# Patient Record
Sex: FEMALE | Race: BLACK OR AFRICAN AMERICAN | NOT HISPANIC OR LATINO | Employment: OTHER | URBAN - METROPOLITAN AREA
[De-identification: names, ages, dates, MRNs, and addresses within clinical notes are randomized per-mention and may not be internally consistent; named-entity substitution may affect disease eponyms.]

---

## 2017-05-05 ENCOUNTER — GENERIC CONVERSION - ENCOUNTER (OUTPATIENT)
Dept: OTHER | Facility: OTHER | Age: 77
End: 2017-05-05

## 2017-05-08 ENCOUNTER — GENERIC CONVERSION - ENCOUNTER (OUTPATIENT)
Dept: OTHER | Facility: OTHER | Age: 77
End: 2017-05-08

## 2018-04-17 ENCOUNTER — TELEPHONE (OUTPATIENT)
Dept: PAIN MEDICINE | Facility: CLINIC | Age: 78
End: 2018-04-17

## 2018-04-17 NOTE — TELEPHONE ENCOUNTER
Pt returning call  Pt did not know she was being referred to see Dr Catarina Zuleta  Told pt we still need prior records before scheduling her and she stated she was going to call Dr Vaishnavi Vaughn to get clarification as to why she is being referred to us   Pt will call back once she talks to Dr Corinthia Oppenheim call back # [phone]

## 2018-04-26 NOTE — TELEPHONE ENCOUNTER
I called pt and she said she wants to think a while longer if she wants to see Dr Paulette Evans  She said she would call us back if she wants to schedule

## 2019-05-01 NOTE — TELEPHONE ENCOUNTER
Called pt and let her know we need med recs from Dr Saray Mcintyre  She said to mail her an julianne and she will mail it back to us  Received faxed referral from Dr Edel Flores

## 2019-05-20 NOTE — TELEPHONE ENCOUNTER
S/w pt and she said she received the julianne in the mail  She said she has not filled it out yet  I explained if she does and mails it back, then I can get the med recs from Dr Velna Goodell

## 2019-06-12 NOTE — TELEPHONE ENCOUNTER
Faxed a request from our office to Dr Angel Perry for med recs  I have not received patient's julianne yet

## 2023-06-13 ENCOUNTER — EVALUATION (OUTPATIENT)
Age: 83
End: 2023-06-13
Payer: MEDICARE

## 2023-06-13 DIAGNOSIS — R29.898 WEAKNESS OF BOTH LOWER EXTREMITIES: Primary | ICD-10-CM

## 2023-06-13 DIAGNOSIS — I69.359 CVA, OLD, HEMIPARESIS (HCC): ICD-10-CM

## 2023-06-13 DIAGNOSIS — I63.9 CEREBROVASCULAR ACCIDENT (CVA), UNSPECIFIED MECHANISM (HCC): Primary | ICD-10-CM

## 2023-06-13 PROCEDURE — 97530 THERAPEUTIC ACTIVITIES: CPT

## 2023-06-13 PROCEDURE — 97167 OT EVAL HIGH COMPLEX 60 MIN: CPT

## 2023-06-13 PROCEDURE — 97163 PT EVAL HIGH COMPLEX 45 MIN: CPT

## 2023-06-13 NOTE — PROGRESS NOTES
PT Evaluation          POC expires Auth Status Total   Visits  Start date  Expiration date PT/OT + Visit Limit? Co-Insurance   23 Not req BOMN 23 no Yes                                         Visit/Unit Tracking                                                   Today's date: 2023  Patient name: Zebedee Severe  : 1940  MRN: [id number]  Referring provider: Kathie Olivier MD  Dx:   Encounter Diagnosis     ICD-10-CM    1  CVA, old, hemiparesis ([facility] [address] )  I69 359       2  Weakness of both lower extremities  R29 898             Assessment  Assessment details: Patient is a 80 y o  Female who presents to skilled outpatient PT with chronic CVA (early )  Upon initial evaluation, pt has R-sided hemiparesis/hemiplegia from CVA resulting in dec LE strength, poor posture, dec sitting balance, dec sensation in RLE, dec activity tolerance and endurance  Completed 3 bouts of standing, 2 person assist ranging from mod Ax2-max Ax1 with 2nd person A for blocking R knee  Pt will benefit from skilled OP PT to maximize functional mobility independence and dec risk for falls  Will see pt 2x/week  Significant amount of time spent completing patient education about bracing, LE strengthening HEP, and POC  Pt and caregivers agreeable  Impairments: Abnormal coordination, Abnormal gait, Abnormal muscle tone, Abnormal or restricted ROM, Activity intolerance, Impaired balance, Impaired physical strength, Lacks appropriate HEP, Poor posture, Poor body mechanics, Pain with function, Safety issue, Weight-bearing intolerance, Abnormal movement, Difficulty understanding, Abnormal muscle firing  Understanding of Dx/Px/POC: Good  Prognosis: Good    Patient verbalized understanding of POC  Please contact me if you have any questions or recommendations  Thank you for the referral and the opportunity to share in  [facility] care      Plan  Plan details: Will see pt 2x/week  Patient would benefit from: PT Eval, Skilled PT, OT Eval and Speech Eval  Planned modality interventions: Biofeedback, Cryotherapy, TENS, Thermotherapy  Planned therapy interventions: Abdominal trunk stabilization, ADL training, Balance, Balance/WB training, Breathing training, Body mechanics training, Coordination, Functional ROM exercises, Gait training, HEP, Joint Mobilization, Manual Therapy, Gloria taping, Motor coordination training, Neuromuscular re-education, Patient education, Postural training, Strengthening, Stretching, Therapeutic activities, Therapeutic exercises, Therapeutic training, Transfer training, Activity modification, Work reintegration  Frequency: 2x/wk  Duration in weeks: 12  Plan of Care beginning date: 6/13/23  Plan of Care expiration date: 12 weeks - 9/5/2023  Treatment plan discussed with: Patient and caretakers     Goals  Short Term Goals (4 weeks):    - Patient will be independent in basic HEP 2-3 weeks  - Patient will improve 5xSTS score by 2 3 seconds to promote improved LE functional strength needed for ADLs  - Patient will demo standing tolerance to 60s  - Pt will improve SIS to 230   - Pt will improve STS to mod Ax1    Long Term Goals (12 weeks):  - Patient will be independent in a comprehensive home exercise program  - Patient will complete SCOTT to facilitate return to safe independent ambulation  - Patient will stand at [facility] for 2 minutes  - Patient will improve SIS to 250  - Pt will complete slide board transfer with Mod Ax1 to demo improved independence and dec caregiver burden    Subjective  History of Present Illness  Mechanism of injury: Pt reports 2022 CVA using letty lift and dependent at home  She is not doing any standing at home, completed leg strengthening exercises in chair  She has been receiving PT/OT at [facility] for the past year  Reports ability to stand utilizing standing frame, has not completed without   Goal is to get better with transfers and walk  She is accompanied by two caretakers  Spoke with daughter on phone, will get [facility] faxed over for more information of current status  Primary AD: none/letty dependent  Assist level at home: dependent   WC usage: primary   PLOF: independent before CVA    Pain  Current pain rating: none  Hand dominance: R handed    Objective   LE MMT  - R Hip Flexion: 0/5  - L Hip Flexion: 1/5  - R Knee Extension: 1+/5 - L Knee Extension: 3+/5  - R Ankle DF: 1/5  - L Ankle DF: 3-/5  - R Ankle PF: 0/5  - L Ankle PF: 1+/5    Sensation  - Light touch: WFL  - Pt reports dec sensation in RLE & RUE    Modified Kush  - R knee extension: 0 - no increase in tone  - L knee extension: 0 - no increase in tone  - R knee flexion: 0 - no increase in tone  - L knee flexion: 0 - no increase in tone  - R ankle DF: 0 - no increase in tone   - L ankle DF: 0 - no increase in tone     Standing:   - 3 stands at long bar mirror  Mod Ax2 2 repetitions (20s, 15s), max Ax1 with SBA and R knee blocking by 2nd person (11s)      Outcome Measures Initial Eval  6/13 scores   SIS *caregiver completed physical 10    Memory & thinking 29    Mood and emotions 41    communication 30    ADLs 17    mobility 13    hand 9    Activities/meaning 41    How much recovered since CVA 30    TOTAL SCORE: 220/395        Precautions: CVA, awaiting fax of [facility] notes of PMHx  No past medical history on file

## 2023-06-13 NOTE — PROGRESS NOTES
OCCUPATIONAL THERAPY INITIAL EVALUATION    Today's Date: 2023  Patient Name: Guevara Escalante  : 1940  MRN: [id number]  Referring Provider: Dee Zambrano MD  Dx: Cerebrovascular accident (CVA), unspecified mechanism ([facility] [address] ) [I63 9]    SKILLED ANALYSIS:            Pt is a right hand dominant 80year old female presenting to OP OT s/p CVA (~2 years ago) with R side weakness (chronic)  Pt and caregivers currently reports difficulty with all ADLs, use of R UE, sitting balance, endurance/activity tolerance, bed mobility, transfers  Pt was receiving outpatient OT at [place] [address] 2x/week prior to transfer to this facility  Pt reports previous therapy occasionally performed lateral transfers to/from mat with transfer board or utilized mechanical  Reports they were addressing R UE function  Pt reports slight blurry vision  Noticeable cognitive impairments with aphasia throughout evaluation  Pt is demonstrating deficits based on clinical observation and the following assessments: AROM, PROM seated, MAS of R UE, temporal orientation, coordination testing  Post assessments pt demonstrating the following: Presents with impaired R UE and L UE ROM and strength overall, R UE weaker then L UE  Significantly limited AROM of R UE, specifically shoulder  L UE functional at this time  Slight pain at elbow with R UE PROM due to spasticity  Presents with chronic subluxation on middle aspect of R shoulder  Due to cognitive deficits and aphasia, pt had difficulty comprehending coordination testing components  Noted with impaired [address] and [address] through clinical  observation  1+ at shoulder flexors and elbow flexors on MAS  Pt has been seen for OT services and would benefit from transitioning to maintenance program as patient demonstrates difficulty in daily tasks, including remembering recent events, ADL management, and completing health management     Tessa Sorensen (2013) is a [facility] decision that clarified "that Medicare will in fact cover skilled therapy services to maintain a patient’s current condition or prevent/slow further deterioration  Recommend OP OT 1-2x/wk for 8-12 weeks with focus on aforementioned deficits to maximize functional performance and improve QOL  Findings and recommendations discussed with pt, and they are in agreement  Educated caregivers on charges of insurance, POC, goal creation, and OP OT services  POC expires Auth Status Total   Visits  Start date  Expiration date PT/OT + Visit Limit? Co-Insurance   9/13/23 BOMN   6/13  BOMN                                            Visit/Unit Tracking  AUTH Status:  Date 6/13              Visits  Authed:  Used 1(eval)                Remaining                  PLAN OF CARE START:6/13/23  PLAN OF CARE END: 9/13/2023  PROGRESS NOTE DUE: follow up post scheduling/auth   FREQUENCY: 1-2x/week for 8-12 weeks   PRECAUTIONS: 1:1 at all times, cog/aphasia, maintainence program     DIAGNOSIS: Chronic CVA with R side weakness   VISITS: 1 (Eval)     Subjective  Occupational Profile  Pt resides with daughter,  recently passed away  Pt resides in private home with ramped enterance  First floor set up  Transferring in/out of bed via mechanical lift  All ADLs total A bed level at this time  Total A with IADLs including community mobility  Pt does not own any braces  Pt currently not working, however during working years was a   Pt reports interest in clothing including \"creation\" of them  Pt has 24 hours private care  Pt remains in bed majority of the time, transfers out of bed for therapy only  Aide reports patient sits EOB occasionally  PATIENT GOAL: \"to move from bed to chair without letty lift\"     HISTORY OF PRESENT ILLNESS:   Pt is a 80 y o  female who was referred to Occupational Therapy s/p  Cerebrovascular accident (CVA), unspecified mechanism ([facility] [address] ) [I63 9] Hx of CVA ~2 years ago   Pt was receiving outpatient therapy services " at [facility], transferred to this facility due to location  Pt was receiving OT/PT/SLP 2x/week  Last visit reported by patient was last week  PMH: No past medical history on file      Past Surgical Hx:   Past Surgical History:   Procedure Laterality Date   • FL GUIDED CENTRAL VENOUS ACCESS DEVICE REMOVAL  9/14/2021        Pain:  Location: legs and back occasionally     Objective  Impairment Observations:    CLAIRE Fraser           UPPER EXTREMITY FUNCTION   Impaired Impaired Dominant Hand: R UE     AROM (Seated)      ~1 finger subluxation noted in R UE middle aspect of joint     Elevation <1/2 range  Near full     Shoulder FF <1/2 range   3/4 range      Shoulder Ext <1/2 range   3/4 range      Shoulder Abd <1/2 range   3/4 range      Shoulder Add <1/2 range   1/2 range      Horizontal Abd <1/2 range   1/2 range      Horizontal Add <1/2 range   Near full      External rotation  <1/2 range  Near full     Elbow Flex 1/2 range  Near full      Elbow Ext 1/2 range  Near full      Pronation <1/2 range   Near full      Supination <1/2 range  Near full      Wrist Flex 1/2 range   Full      Wrist Ext 1/2 range  Full      Digit Flex 3/4 range   full     Digit Ex 3/4 range   Full      Composite Grasp 3/4 range   Full        PROM (seated position)    Right side tested only        Shoulder FF 3/4 range     Shoulder Ext 3/4 range      Shoulder ABD 3/4 range      Shoulder ADD 3/4 range      Horizontal ABD 3/4 range     Horizontal ADD 3/4 range      Elbow Flex 3/4 range   Spasticity noted    Elbow Ext 3/4 range      Wrist Flex 3/4 range      Wrist Ext 3/4 range      Digit Flex full     Digit Ext Full      Supination 3/4 range      Pronation 3/4 range         COORDINATION      Opposition Unable  Impaired  Difficulty with command following    Finger to Nose Unable  Impaired   difficulty with command following      TONE: MODIFIED EMMANUEL SCALE      No increase in muscle tone (0)      Slight Increase in muscle tone with catch and release or min resist at end range (1)      Slight Increase in muscle tone with catch and release, followed by min resistance through remainder of range (1+) Elbow flexors, shoulder flexors      Increased muscle tone through full range, able to be moved easily (2)      Considerable increase in tone, difficult to move (3)      Rigid in Flexion/Extension (4)               TEMPORAL ORIENTATION: oriented to month, day of the week, and date only  Reported it was 2024   Oriented to type of facility and location   SITTING BALANCE: per clincial judgement, up to max A     SHORT TERM GOALS (4-6 weeks)    GOAL  STATUS ON IE  GOAL STATUS    Caregiver will demo good carryover of clinic and home tone reduction strategies for maintained AROM initiation with functional reach with ADL fxn 1+ Established      Caregivers will demo G carryover of Home Exercise Program to improve functional progression towards goals in plan of care and for improved functional use of UE  Established      Pt will tolerate supine and/or seated exercise x 8 minutes with minimal rest breaks required for maintained engagement in life roles and weekly exercise regimen   Established      Pt will demo ability to tolerate SS position EOM with mod-max A for sitting balance while performing therapeutic activity of choice in order to maintain core stability for no less then 10 minutes  Max A  Established      LONG TERM GOALS( 8-12 weeks)    GOAL  STATUS ON IE  GOAL STATUS    Pt will be oriented x4 100% of the time for 5/5 trials using external cues as needed  x3  Established      Pt will tolerate supine and/or seated exercise x 10 minutes with minimal rest breaks required for maintained engagement in life roles and weekly exercise regimen   Established      Pt will demo ability to follow 1-2 step commands with 100% accuracy for 3/5 trials for maintained engagement in salient tasks  Established      Caregivers will demo and/or verbalize understanding of use of recommended DME and/or AE for increased patient safety   Established      Caregivers will demo G comprehension of safe transfer and bed mobility techniques to maintain safety during ADLs and mobility   Established            OTHER PLANNED THERAPY INTERVENTIONS:   Supine, seated, and in stance neuro re-ed  Tricep AG  NMES/FES  FMC/prehension  Timed Trials  Manual tx  Hand to target  Sensory re-ed  Seated functional reach: crossing midline  Supine place and hold  WBearing strategies   Closed chain activities  Open chain activities  Internal and external memory aides  Multimatrix for saccades/ visual clutter/attention  Hypersensitivity strategies education  Multi-modal environment  Sustained/alternating/divided attention  Tracking tube  Oculomotor control:  saccades, con/divergence  Conv /div   Dynamic tasks  Work stations with timed transitions  Temporal Awareness  Memory and mental manipulation  Auditory processing with immediate recall  Memory retention with immediate and delayed recall  Edu on cog/vision apps

## 2023-06-15 ENCOUNTER — OFFICE VISIT (OUTPATIENT)
Age: 83
End: 2023-06-15
Payer: MEDICARE

## 2023-06-15 DIAGNOSIS — R29.898 WEAKNESS OF BOTH LOWER EXTREMITIES: Primary | ICD-10-CM

## 2023-06-15 DIAGNOSIS — I69.359 CVA, OLD, HEMIPARESIS (HCC): ICD-10-CM

## 2023-06-15 PROCEDURE — 97112 NEUROMUSCULAR REEDUCATION: CPT | Performed by: PHYSICAL THERAPIST

## 2023-06-15 NOTE — PROGRESS NOTES
Daily Note     Today's date: 6/15/2023  Patient name: Rodrigue Mason  : 1940  MRN: [id number]  Referring provider: Loy Rodriguez MD  Dx:   Encounter Diagnosis     ICD-10-CM    1  Weakness of both lower extremities  R29 898       2  CVA, old, hemiparesis ([facility] [address] )  I69 359                      Subjective: PT arrive in w/c and ready for therapy  Present is daughter and home health aides  Objective: See treatment diary below  Sit to standing @ long bar: 2 minute rest break between each attempt to stand to allow recovery for max effort for next rep pre research standards  Rep 1: Mod A x 2 with cuing to maintain knee extension standing duration 20-30 seconds   Rep 2 : Min A x 1 with cuing for upright posture, standing duration 60 seconds    Rep 3: CGA with ability to pull self up standing duration 20 seconds    Rep 4: CGA with ability to pull self up, standing duration 20 seconds    Rep 5: Min A x 1 with, cuing upright posture, standing duration 15 seconds    Rep 6: Min A x 1 with standing duration of 15 seconds  Seated LAQ    R LE: manual resistance with extension and flexion 2 sets 10 reps, varying resistance to achieve full range   L LE: manual resistance with extension and flexion 2 sets 10 reps, varying resistance to achieve full range     Seated march:   R LE: active assistive to achieve full height and cuing for eccentric lowering to challenge hip flexor more, 2 sets x 10 reps    L LE: manual resistance throughout range with varying degree to allow full range   Sitting upright in w/c 1 minute active 1 minute rest break    5 reps total with cuing for upright posture throughout  Seated active over head lift of arms 10 reps with Min A to Mod A for right side to achieve full range   Seated upright trunk rotation with active assistance to allow rotation to end range 2 sets 10 reps     HEP:  Access Code: [email or web address]  URL: [email or web address]  Date: 06/15/2023  Prepared by: Carolina Roy Kiner    Exercises  - Seated March  - 1 x daily - 7 x weekly - 2 sets - 10 reps - 3 hold  - Seated Long Arc Quad  - 1 x daily - 7 x weekly - 2 sets - 10 reps - 3 hold  - Seated Trunk Rotation - Arms Crossed  - 1 x daily - 7 x weekly - 2 sets - 10 reps - 3 hold  - Seated Upright Posture Correction  - 1 x daily - 7 x weekly - 2 sets - 5 reps - 60 hold    Assessment:  Tolerated session well this date with focus on standing tolerance and LE active movement  Required cuing for upright posture as needed during session  Was able to perform 2 sit to stands without physical assistance from PT  Pt has excellent movement in LEs which will continue to enhance standing ability  Education to patient conduct exercises as often as she can at home to further improve strength  Advised to conduct an exercises every 30 minutes during commercial break if watching TV  Noted that next session will perform stand pivot transfer form w/c to edge of mat table to further improve transfer ability  Stressed to patient that she has great potentional to improve function, but she need to do as much as she can herself at home to further enhance current gains she has  Pt was agreeable to PT recommendation  Spoke with patient daughter about sending HEP via email and will issue physical papers next session  Will reach out to seizure MD for clearance for possible use of NMES to rafael side  Asked if they are able to bring in the walker they have at home secondary to walkers here at the clinic are standard and have a weight limit  Plan: Continue per plan of care  POC expires Auth Status Total   Visits  Start date  Expiration date PT/OT + Visit Limit?  Co-Insurance   9/5/23 Not req BOMN 6/13/23 9/5/23 no Yes                                         Visit/Unit Tracking  6/13 6/15

## 2023-06-20 ENCOUNTER — APPOINTMENT (OUTPATIENT)
Age: 83
End: 2023-06-20
Payer: MEDICARE

## 2023-06-22 ENCOUNTER — OFFICE VISIT (OUTPATIENT)
Age: 83
End: 2023-06-22
Payer: MEDICARE

## 2023-06-22 ENCOUNTER — TELEPHONE (OUTPATIENT)
Age: 83
End: 2023-06-22

## 2023-06-22 DIAGNOSIS — I63.9 CEREBROVASCULAR ACCIDENT (CVA), UNSPECIFIED MECHANISM (HCC): Primary | ICD-10-CM

## 2023-06-22 DIAGNOSIS — I69.359 CVA, OLD, HEMIPARESIS (HCC): ICD-10-CM

## 2023-06-22 DIAGNOSIS — R29.898 WEAKNESS OF BOTH LOWER EXTREMITIES: Primary | ICD-10-CM

## 2023-06-22 PROCEDURE — 97112 NEUROMUSCULAR REEDUCATION: CPT

## 2023-06-22 PROCEDURE — 97112 NEUROMUSCULAR REEDUCATION: CPT | Performed by: PHYSICAL THERAPIST

## 2023-06-22 PROCEDURE — 97530 THERAPEUTIC ACTIVITIES: CPT

## 2023-06-22 NOTE — PROGRESS NOTES
POC expires Auth Status Total   Visits  Start date  Expiration date PT/OT + Visit Limit? Co-Insurance   23 BOMN     BOMN                                            Visit/Unit Tracking  AUTH Status:  Date              Visits  Authed:  Used 1(eval)  1                             PLAN OF CARE START:23  PLAN OF CARE END: 2023  PROGRESS NOTE DUE: follow up post scheduling/auth   FREQUENCY: 1-2x/week for 8-12 weeks   PRECAUTIONS: 1:1 at all times, cog/aphasia, maintainence program     DIAGNOSIS: Chronic CVA with R side weakness   VISITS: 2 of 24       Daily Note     Today's date: 2023  Patient name: Antony Olivas  : 1940  MRN: [id number]  Referring provider: Renard Story MD  Dx:   Encounter Diagnosis   Name Primary? • Cerebrovascular accident (CVA), unspecified mechanism ([facility] [address] ) Yes       Start Time: 1500  Stop Time: 1543  Total time in clinic (min): 43 minutes      Subjective: Pt reports no changes since last vision       Objective: See treatment below  TA:   *R UE AAROM/PROM HEP:   Therapist reviewed and provided visual demonstration of HEP  Pts caregivers verbalized understanding of exercises back to therapist  Provided pt with written description and image of exercises  Instructed to discontinue with any discomfort or irritation  Reviewed the following exercises:   Access Code: [email or web address]  URL: [email or web address]  Exercises  - Seated Shoulder Flexion Towel Slide at Table Top  - 1 x daily - 7 x weekly - 2 sets - 10 reps  - Seated Shoulder Flexion AAROM with Caregiver  - 1 x daily - 7 x weekly - 2 sets - 10 reps  - Seated Shoulder Abduction AAROM with Caregiver  - 1 x daily - 7 x weekly - 2 sets - 10 reps  - Supported Elbow Flexion Extension PROM  - 1 x daily - 7 x weekly - 2 sets - 10 reps  - Seated Wrist Extension PROM with caregiver  - 1 x daily - 7 x weekly - 2 sets - 10 reps  - Seated PROM Wrist Flexion PROM with Caregiver  - 1 x daily - 7 x weekly - 2 sets - 10 reps    *total A x2 (max A x1, min-mod A x1) to perform lateral transfer with board w/c<>EOM    NMRE:   *performed the following seated SS EOM with emphasis on sitting balance, weight shifting, and functional reaching:  -reach,grasp,release of cones R side outside CARLITO<>L side of mat using L UE to grasp cones  CS-CG for sitting balance, mod VC for initiation, and VC for encouragement, x3 bouts total with ~8 cones   -R side lateral forearm props with therapist facilitation for ascend/descend to/from mat  Assist to weight bearing through forearm, x12 reps total     Assessment: Tolerated treatment well  Benefits form verbal encouragement throughout session  Pt would benefit from continued OT services to address R UE AAROM, weight bearing, sitting balance, tone management, endurance/acitivty tolerance, basic direction following, sustained attention  Plan: Continued skilled OT per POC

## 2023-06-22 NOTE — PROGRESS NOTES
Daily Note     Today's date: 2023  Patient name: Micheal Higgins  : 1940  MRN: [id number]  Referring provider: Marbella Cheng MD  Dx:   Encounter Diagnosis     ICD-10-CM    1  Weakness of both lower extremities  R29 898       2  CVA, old, hemiparesis ([facility] [address] )  I69 359                      Subjective: PT arrive in w/c and ready for therapy  Reports doing home program, Aide notes doing it daily  Objective: See treatment diary below  Sit to standing @ long bar: 1-2 minute rest break between each attempt to stand to allow recovery for max effort for next rep pre research standards  Rep 1: Mod A x 1 with cuing to maintain knee extension standing duration 20 seconds   Rep 2: Max A x 1 with cuing for upright posture, standing duration 30 seconds    Rep 3: Mod A x 2  with ability to pull self up standing duration 30 seconds    Rep 4: Mod A x 2  with ability to pull self up standing duration 30 seconds    Rep 5: Max A x 2 with, cuing upright posture, standing duration 20 seconds    Rep 6: Max A x 2 with standing duration of 15 seconds    Standing with rolling walker, patient home walker rated at 350lb  Rep 7: With Rolling Walker: Max x 2 for stand and Max A x 1 for standing 15 seconds   Rep 8: with Rolling walker: set up with feet underneath her to allow 90 deg knee flexion, max A x 2 with standing for 30 seconds with Max A x 1 for standing    Rep 9: with Rolling walker: set up with feet underneath her to allow 90 deg knee flexion, max A x 2 with standing for 15 seconds with Max A x 1 for standing     Seated LAQ    R LE: manual resistance with extension  and flexion 2 sets 10 reps, varying resistance to achieve full range    L LE: manual resistance with extension  2 sets 10 reps, 3 sec hold end range with 2lb ankle wt on left leg       Seated march:   R LE: active assistive to achieve full height and cuing for eccentric lowering to challenge hip flexor more, 2 sets x 10 reps    L LE: manual resistance throughout range with varying degree to allow full range     Sitting upright in w/c 1 minute active 1 minute rest break    5 reps total with cuing for upright posture throughout    Seated active over head lift of arms 10 reps with Min A to Mod A for right side to achieve full range       HEP:  Access Code: [email or web address]  URL: [email or web address]  Date: 06/15/2023  Prepared by: Xavier Hugigns    Exercises  - Seated March  - 1 x daily - 7 x weekly - 2 sets - 10 reps - 3 hold  - Seated Long Arc Quad  - 1 x daily - 7 x weekly - 2 sets - 10 reps - 3 hold  - Seated Trunk Rotation - Arms Crossed  - 1 x daily - 7 x weekly - 2 sets - 10 reps - 3 hold  - Seated Upright Posture Correction  - 1 x daily - 7 x weekly - 2 sets - 5 reps - 60 hold    Assessment:  Tolerated session well this date with focus on standing tolerance and LE active movement  Required cuing for upright posture as needed during session  Required increased assistance today with sit to stands with average assistance Min A to Max A with 1 person to 2 person assist  Was unable to pull self up today, max encouragement provided with cuing to tighten knees and buttock region to remain upright  Improvement in standing tolerance with usage of leg with RW this date than long bar  Improvement BL hip flexion with slight leaning back into w/c to allow clearance for active hip flexion  Use of 2 lb wt on left side and active assisted/ manual resistance on right side  Printed physical copy of HEP this date and gave to aide  Still waiting on MD clearance for possible use of NMES to rafael side  Aide to continue to bring their walker from home  Plan: Continue per plan of care  POC expires Auth Status Total   Visits  Start date  Expiration date PT/OT + Visit Limit?  Co-Insurance   9/5/23 Not req BOMN 6/13/23 9/5/23 no Yes                                         Visit/Unit Tracking  6/13 6/15 6/22

## 2023-06-27 ENCOUNTER — OFFICE VISIT (OUTPATIENT)
Age: 83
End: 2023-06-27
Payer: MEDICARE

## 2023-06-27 DIAGNOSIS — I63.9 CEREBROVASCULAR ACCIDENT (CVA), UNSPECIFIED MECHANISM (HCC): Primary | ICD-10-CM

## 2023-06-27 DIAGNOSIS — I69.359 CVA, OLD, HEMIPARESIS (HCC): ICD-10-CM

## 2023-06-27 DIAGNOSIS — R29.898 WEAKNESS OF BOTH LOWER EXTREMITIES: Primary | ICD-10-CM

## 2023-06-27 PROCEDURE — 97112 NEUROMUSCULAR REEDUCATION: CPT

## 2023-06-27 PROCEDURE — 97530 THERAPEUTIC ACTIVITIES: CPT

## 2023-06-27 PROCEDURE — 97112 NEUROMUSCULAR REEDUCATION: CPT | Performed by: PHYSICAL THERAPIST

## 2023-06-27 PROCEDURE — 97530 THERAPEUTIC ACTIVITIES: CPT | Performed by: PHYSICAL THERAPIST

## 2023-06-27 NOTE — PROGRESS NOTES
Daily Note     Today's date: 2023  Patient name: Fay Dominguez  : 1940  MRN: [id number]  Referring provider: Angelo Hull MD  Dx:   Encounter Diagnosis     ICD-10-CM    1  Weakness of both lower extremities  R29 898       2  CVA, old, hemiparesis ([facility] [address] )  I69 [id number]                      Subjective: PT arrive in w/c and ready for therapy  Reports doing home program, Aide notes doing it daily  Objective: See treatment diary below  Sit to standing @ long bar: 1-2 minute rest break between each attempt to stand to allow recovery for max effort for next rep pre research standards  VCs: to remain upright, manual blocking of right knee with PT knee to hold knee extension  Rep 1: Mod A x 2 with progression to min A X 1 once upright  Duration of standing 15 seconds    Rep 2: Mod A x 2 with progression to min A X 1 once upright  Duration of standing 20 seconds   Rep 3: Mod A x 2 with progression to min A X 1 once upright  Duration of standing 10 seconds   Rep 4: Mod A x 2 with progression to min A X 1 once upright  Duration of standing 8 seconds   Rep 5: Mod A x 2 with progression to min A X 1 once upright  Duration of standing 7 seconds   Refused to stand any further, re-attempted post Seated marches and LAQ  With ability to one conducted 1 further stand,   Rep 6: Max A x 1 with progression to min A X 1 once upright  Duration of standing 8 seconds    Seated LAQ    R LE: manual resistance with extension  and flexion 2 sets 10 reps, varying resistance to achieve full range    L LE: manual resistance with extension  2 sets 10 reps, 3 sec hold end range with 2lb ankle wt on left leg       Seated march:   R LE: active assistive to achieve full height and cuing for eccentric lowering to challenge hip flexor more, 2 sets x 10 reps    L LE: manual resistance throughout range with varying degree to allow full range     Sitting upright in w/c 1 minute active 1 minute rest break    5 reps total with cuing for upright posture throughout    Seated active over head lift of arms 10 reps with Min A to Mod A for right side to achieve full range       HEP:  Access Code: [email or web address]  URL: [email or web address]  Date: 06/15/2023  Prepared by: Emmie Lanes    Exercises  - Seated March  - 1 x daily - 7 x weekly - 2 sets - 10 reps - 3 hold  - Seated Long Arc Quad  - 1 x daily - 7 x weekly - 2 sets - 10 reps - 3 hold  - Seated Trunk Rotation - Arms Crossed  - 1 x daily - 7 x weekly - 2 sets - 10 reps - 3 hold  - Seated Upright Posture Correction  - 1 x daily - 7 x weekly - 2 sets - 5 reps - 60 hold    Assessment:  Tolerated session fair this date with focus on standing tolerance and LE active movement  Required cuing for upright posture as needed during session  Continues to require physical assistance of Mod A x 2 to achieve upright and max encouragement to remain up right  Pt did refuse to continue with standing activity but able to complete at end of session after switching focus  Standing tolerance of 20 to 8 seconds  Increased engagement with seated march and LAQ  Aide to continue to bring their walker from home  Plan: Continue per plan of care  POC expires Auth Status Total   Visits  Start date  Expiration date PT/OT + Visit Limit?  Co-Insurance   9/5/23 Not req BOMN 6/13/23 9/5/23 no Yes                                         Visit/Unit Tracking  6/13 6/15 6/22 6/27

## 2023-06-27 NOTE — PROGRESS NOTES
POC expires Auth Status Total   Visits  Start date  Expiration date PT/OT + Visit Limit? Co-Insurance   23 BOMN     BOMN                                            Visit/Unit Tracking  AUTH Status:  Date             Visits  Authed:  Used 1(eval)  1 1                           PLAN OF CARE START:23  PLAN OF CARE END: 2023  PROGRESS NOTE DUE: 23 (progress note)   FREQUENCY: 1-2x/week for 8-12 weeks   PRECAUTIONS: 1:1 at all times, cog/aphasia, maintainence program     DIAGNOSIS: Chronic CVA with R side weakness   VISITS: 3 of 24       Daily Note     Today's date: 2023  Patient name: Moiz Muhammad  : 1940  MRN: [id number]  Referring provider: Marisa Reed MD  Dx:   Encounter Diagnosis   Name Primary? • Cerebrovascular accident (CVA), unspecified mechanism ([facility] [address] ) Yes                    Subjective: Pt and caregiver reports no changes since last vision       Objective: See treatment below  TA:   *total A x2 (max A x1, min-mod A x1) to perform lateral transfer with board w/c<>EOM    NMRE:   *performed the following seated SS EOM with emphasis on sitting balance, weight shifting, and functional reaching:  -modified anterior weight shifting with use of large physio ball and therapist shoulder for faciliation, x12 reps total; minimal shift noted with fear of falling   -reach,grasp,release of various plastic disc R side of mat<>tabletop using R UE gross grasp  Assist for release of disc and finger extension  x1 bout to/from EOM performed with increased time  VC throughout for initiation and attention  Assist to move shoulder in gravity eliminated plane    -modified crunches using blue wedge, x10 reps total, up to mod A for concentric movement     Assessment: Tolerated treatment well  Benefits form verbal encouragement throughout session   Pt would benefit from continued OT services to address R UE AAROM, weight bearing, sitting balance, tone management, endurance/acitivty tolerance, basic direction following, sustained attention  Plan: Continued skilled OT per POC

## 2023-06-29 ENCOUNTER — OFFICE VISIT (OUTPATIENT)
Age: 83
End: 2023-06-29
Payer: MEDICARE

## 2023-06-29 DIAGNOSIS — R29.898 WEAKNESS OF BOTH LOWER EXTREMITIES: Primary | ICD-10-CM

## 2023-06-29 DIAGNOSIS — I69.359 CVA, OLD, HEMIPARESIS (HCC): ICD-10-CM

## 2023-06-29 DIAGNOSIS — I63.9 CEREBROVASCULAR ACCIDENT (CVA), UNSPECIFIED MECHANISM (HCC): Primary | ICD-10-CM

## 2023-06-29 PROCEDURE — 97112 NEUROMUSCULAR REEDUCATION: CPT

## 2023-06-29 PROCEDURE — 97530 THERAPEUTIC ACTIVITIES: CPT

## 2023-06-29 NOTE — PROGRESS NOTES
POC expires Auth Status Total   Visits  Start date  Expiration date PT/OT + Visit Limit? Co-Insurance   23 BOMN     BOMN                                            Visit/Unit Tracking  AUTH Status:  Date            Visits  Authed:  Used 1(eval)  1 1 1            Remaining               PLAN OF CARE START:23  PLAN OF CARE END: 2023  PROGRESS NOTE DUE: 23 (progress note)   FREQUENCY: 1-2x/week for 8-12 weeks   PRECAUTIONS: 1:1 at all times, cog/aphasia, maintainence program     DIAGNOSIS: Chronic CVA with R side weakness   VISITS: 4 of 24       Daily Note     Today's date: 2023  Patient name: Hilda Powers  : 1940  MRN: [id number]  Referring provider: Jamie Florian MD  Dx:   Encounter Diagnosis   Name Primary? • Cerebrovascular accident (CVA), unspecified mechanism ([facility] [address] ) Yes                    Subjective: Pt and caregiver reports no changes since last vision       Objective: See treatment below  TA:   *total A x2 (max A x1 anteriorly, min-mod A x3 posteriorly) to perform lateral transfer with board w/c<>EOM    *R UE PROM: seated EOM into all planes and pivots to patient tolerance with 5-10 second hold at end range  Focused on decreased stiffness, increased ROM, and to promote motor recovery for increased functional use of R UE  Performed in prep for therapeutic activity  NMRE:   *performed the following seated SS EOM with emphasis on sitting balance, weight shifting, and functional reaching:  -blaze pod tap in field of 2 using L UE to reach to right side of tabletop and tap when lighting up randomly  VC for initiation and large amplitude movements   -reach, grasp,release of yellow and red resistive clothespins table on R side>mat on L side using L UE 3 point pinch  Max VC for initiation  Assessment: Tolerated treatment fair  Pt required up to max A for sitting balance this session and increased transfer assistance    Benefits form verbal encouragement throughout session  Pt would benefit from continued OT services to address R UE AAROM, weight bearing, sitting balance, weight shifting, tone management, endurance/acitivty tolerance, basic direction following, sustained attention  Plan: Continued skilled OT per POC

## 2023-06-29 NOTE — PROGRESS NOTES
Daily Note     Today's date: 2023  Patient name: Ike Muñoz  : 1940  MRN: [id number]  Referring provider: Meghana Kolb MD  Dx:   Encounter Diagnosis     ICD-10-CM    1  Weakness of both lower extremities  R29 898       2  CVA, old, hemiparesis ([facility] [address] )  I69 [id number]                      Subjective: Pt reports that she is doing well today  Objective: See treatment diary below  Sit to standing @ long bar: 1-2 minute rest break between each attempt to stand to allow recovery for max effort for next rep pre research standards  VCs: to remain upright, manual blocking of right knee with PT knee to hold knee extension  Rep 1: Mod A x 2 with progression to min A X 1 once upright  Duration of standing 40 seconds    Rep 2: Mod A x 2 with progression to min A X 1 once upright  Duration of standing 20 seconds   Rep 3: Mod A x 2 with progression to min A X 1 once upright  Duration of standing 25 seconds   Rep 4: Mod A x 2 with progression to min A X 1 once upright  Duration of standing 8 seconds   Rep 5: Mod A x 2 with progression to min A X 1 once upright  Duration of standing 20 seconds   Refused to stand any further, re-attempted post Seated marches and LAQ  With ability to one conducted 1 further stand,   Rep 6: Max A x 1 with progression to min A X 1 once upright  Duration of standing 8 seconds    Seated LAQ    R LE: manual resistance with extension  and flexion 2 sets 10 reps, varying resistance to achieve full range    L LE: manual resistance with extension  2 sets 10 reps, 3 sec hold end range with 2lb ankle wt on left leg       Seated march:   R LE: active assistive to achieve full height and cuing for eccentric lowering to challenge hip flexor more, 2 sets x 10 reps    L LE: manual resistance throughout range with varying degree to allow full range     Sitting upright in w/c 1 minute active 1 minute rest break    5 reps total with cuing for upright posture throughout    Seated active over head lift of arms 10 reps with Min A to Mod A for right side to achieve full range       HEP:  Access Code: [email or web address]  URL: [email or web address]  Date: 06/15/2023  Prepared by: Kanchan Wen    Exercises  - Seated March  - 1 x daily - 7 x weekly - 2 sets - 10 reps - 3 hold  - Seated Long Arc Quad  - 1 x daily - 7 x weekly - 2 sets - 10 reps - 3 hold  - Seated Trunk Rotation - Arms Crossed  - 1 x daily - 7 x weekly - 2 sets - 10 reps - 3 hold  - Seated Upright Posture Correction  - 1 x daily - 7 x weekly - 2 sets - 5 reps - 60 hold  - active weight shifting in wheelchair 1 x 10    Assessment:  Tolerated session fair this date with focus on standing tolerance and LE active movement  Patient continues to require mod A x 2 to achieve upright position  Patient requires encouragement in order to stay standing and verbal cues to stay upright  Patient improved her standing tolerance from last visit  Patient was able to tolerate increased resistance with LAQ  Patient needed cues to for posture  Educated patient and aides to bring walker next visit  Plan: Continue per plan of care  POC expires Auth Status Total   Visits  Start date  Expiration date PT/OT + Visit Limit?  Co-Insurance   9/5/23 Not req BOMN 6/13/23 9/5/23 no Yes                                         Visit/Unit Tracking  6/13 6/15 6/22 6/27 6/29

## 2023-07-06 ENCOUNTER — OFFICE VISIT (OUTPATIENT)
Age: 83
End: 2023-07-06
Payer: MEDICARE

## 2023-07-06 DIAGNOSIS — I63.9 CEREBROVASCULAR ACCIDENT (CVA), UNSPECIFIED MECHANISM (HCC): Primary | ICD-10-CM

## 2023-07-06 PROCEDURE — 97530 THERAPEUTIC ACTIVITIES: CPT

## 2023-07-06 PROCEDURE — 97112 NEUROMUSCULAR REEDUCATION: CPT

## 2023-07-06 NOTE — PROGRESS NOTES
POC expires Auth Status Total   Visits  Start date  Expiration date PT/OT + Visit Limit? Co-Insurance   23 BOMN     BOMN                                            Visit/Unit Tracking  AUTH Status:  Date           Visits  Authed:  Used 1(eval)  1 1 1 1           Remaining              PLAN OF CARE START:23  PLAN OF CARE END: 2023  PROGRESS NOTE DUE: 23 (progress note)   FREQUENCY: 1-2x/week for 8-12 weeks   PRECAUTIONS: 1:1 at all times, cog/aphasia, maintainence program     DIAGNOSIS: Chronic CVA with R side weakness   VISITS: 5 of 24       Daily Note     Today's date: 2023  Patient name: Lencho Berry  : 1940  MRN: [id number]  Referring provider: Kailyn Hernandez MD  Dx:   Encounter Diagnosis   Name Primary? • Cerebrovascular accident (CVA), unspecified mechanism ([address]) Yes       Start Time: 1501  Stop Time: 1545  Total time in clinic (min): 44 minutes      Subjective: Pt and caregiver reports no changes since last vision       Objective: See treatment below. TA:   *R UE PROM: seated in wheelchair into all planes and pivots to patient tolerance with 5-10 second hold at end range. Focused on decreased stiffness, increased ROM, and to promote motor recovery for increased functional use of R UE. Performed in prep for therapeutic activity. *pt unable to state year, month, day of the week when prompted. Able to accurately verbalize each when provided with multiple choice option in field of 3 with increased time. NMRE:   *reach,grasp,release of squigs mirror>therapist on R side using L UE. Pt instructed to verbalize any word associated with color she was grasping. Instructed to grasp certain color based off of therapist verbal command. Focus on anterior weight shifting, sitting balance, functional reach, direction following, and problem solving. Required physical assist to weight shift (up to max A ) to grasp items outside CARLITO.  VC for sustained attention in low stim environment. Min-mod VC for verbalizing name of word based off color of squigs. *reach,grasp,release of squigs tabletop anteriorly>bucket on R side using R UE. Required visual cue for gross hand extension/grasp, able to actively perform however apraxia noted. Pt required active assisted support of shoulder and proximal UEs during transition from tabletop>bucket. Focus on R UE motor control and functional movement. x2 bouts total with rest breaks as needed. Assessment: Tolerated treatment well. Benefits form verbal encouragement throughout session. Trini vs. Franchesca (2013) is a [facility] decision that clarified that Medicare will in fact cover skilled therapy services to maintain a patient’s current condition or prevent/slow further deterioration. Pt would benefit from continued OT services to address R UE AAROM, weight bearing, sitting balance, weight shifting, tone management, endurance/acitivty tolerance, basic direction following, sustained attention. Plan: Continued skilled OT per POC.

## 2023-07-11 ENCOUNTER — OFFICE VISIT (OUTPATIENT)
Age: 83
End: 2023-07-11
Payer: MEDICARE

## 2023-07-11 DIAGNOSIS — I69.359 CVA, OLD, HEMIPARESIS (HCC): ICD-10-CM

## 2023-07-11 DIAGNOSIS — I63.9 CEREBROVASCULAR ACCIDENT (CVA), UNSPECIFIED MECHANISM (HCC): Primary | ICD-10-CM

## 2023-07-11 DIAGNOSIS — R29.898 WEAKNESS OF BOTH LOWER EXTREMITIES: Primary | ICD-10-CM

## 2023-07-11 PROCEDURE — 97530 THERAPEUTIC ACTIVITIES: CPT

## 2023-07-11 PROCEDURE — 97112 NEUROMUSCULAR REEDUCATION: CPT

## 2023-07-11 NOTE — PROGRESS NOTES
POC expires Auth Status Total   Visits  Start date  Expiration date PT/OT + Visit Limit? Co-Insurance   23 BOMN     BOMN                                            Visit/Unit Tracking  AUTH Status:  Date          Visits  Authed:  Used 1(eval)  1 1 1 1 1          Remaining   22 21 20 19 18           PLAN OF CARE START:23  PLAN OF CARE END: 2023  PROGRESS NOTE DUE: 23 (progress note)   FREQUENCY: 1-2x/week for 8-12 weeks   PRECAUTIONS: 1:1 at all times, cog/aphasia, maintainence program     DIAGNOSIS: Chronic CVA with R side weakness   VISITS: 6 of 24       Daily Note     Today's date: 2023  Patient name: Opal Almazan  : 1940  MRN: [id number]  Referring provider: Derek Lopez MD  Dx:   Encounter Diagnosis   Name Primary? • Cerebrovascular accident (CVA), unspecified mechanism ([address]) Yes                    Subjective: Pt and caregiver reports no changes since last vision       Objective: See treatment below. TA:   *time spent this session repositioning in wheelchair for optimal letty pad placement. Personal aides required to assist.     *R UE PROM: seated EOM into all planes and pivots to patient tolerance with 5-10 second hold at end range. Focused on decreased stiffness, increased ROM, and to promote motor recovery for increased functional use of R UE. Performed in prep for therapeutic activity. NMRE:   *performed the following seated in w/c with emphasis on sitting balance, weight bearing of R UE, weight shifting, and functional reaching:  -modified anterior weight shifting with use of therapist shoulder for faciliation, x12 reps total; minimal shift noted with fear of falling   -reach,grasp,release of bean bags mat>bucket at midline using R UE gross grasp. x2 bouts total with multiple bean bags. Pt required therapist to assist with steadying proximal UE and provide AAROM during movement.  With VC and visual cues able to inconsistently perform gross grasp/release on bean bag. Performed seated anteriorly facing mat surface as tabletop   -attempted to have patient perform modified push ups using mat surface while seated in w/c, unable to perform due to cognitive and ROM limitations this session    Assessment: Tolerated treatment well. Trailed transfer to mat this session, required use of private aides due to environmental limitations, pt functional status, and w/c safety (only 1 lock of wheelchair properly brakes, unable to fix in this facility due to lack of tools needed). Transfer not completed for safety reasons and need for use of private aides for assistance. Transfers to/from mat will no longer be continued unless adequate amount of employees in this facility only are present to assist and provide superivsion for safety or facility obtains mechanical lift for dependent tranfers. Continue to educate caregivers on safety of fixing wheelchair brakes. Benefits form verbal encouragement throughout session. Trini Sorensen (2013) is a [facility] decision that clarified that Medicare will in fact cover skilled therapy services to maintain a patient’s current condition or prevent/slow further deterioration. Pt would benefit from continued OT services to address R UE AAROM, weight bearing, sitting balance, weight shifting, tone management, endurance/acitivty tolerance, basic direction following, sustained attention. Plan: Continued skilled OT per POC.

## 2023-07-11 NOTE — PROGRESS NOTES
Daily Note     Today's date: 2023  Patient name: Kyle Arriaga  : 1940  MRN: [id number]  Referring provider: Britni Sawyer MD  Dx:   Encounter Diagnosis     ICD-10-CM    1. Weakness of both lower extremities  R29.898       2. CVA, old, hemiparesis ([address])  I69.359           Start Time: 1415  Stop Time: 1455  Total time in clinic (min): 40 minutes    Subjective: Pt reports that she is doing well today. Objective: See treatment diary below  Sit to standing @ long bar: 1-2 minute rest break between each attempt to stand to allow recovery for max effort for next rep pre research standards. VCs: to remain upright, manual blocking of right knee with PT knee to hold knee extension. Rep 1: Mod A x 2 with progression to min A X 1 once upright. Duration of standing 15 seconds    Rep 2: Mod A x 2 with progression to min A X 1 once upright. Duration of standing 20 seconds   Rep 3: Mod A x 2 with progression to min A X 1 once upright. Duration of standing 30 seconds   Rep 4: Mod A x 2 with progression to min A X 1 once upright. Duration of standing 15 seconds   Rep 5: Mod A x 2 with progression to min A X 1 once upright. Duration of standing 20 seconds   Refused to stand any further, re-attempted post Seated marches and LAQ. With ability to one conducted 1 further stand,   Rep 6: Max A x 1 with progression to min A X 1 once upright. Duration of standing 5 seconds    Seated LAQ    R LE: manual resistance with extension  and flexion 2 sets 10 reps, varying resistance to achieve full range    L LE: manual resistance with extension  2 sets 10 reps, 3 sec hold end range with 2lb ankle wt on left leg.      Seated march:   R LE: active assistive to achieve full height and cuing for eccentric lowering to challenge hip flexor more, 2 sets x 10 reps    L LE: manual resistance throughout range with varying degree to allow full range     Sitting upright in w/c 1 minute active 1 minute rest break    5 reps total with cuing for upright posture intermittently     Seated active over head lift of arms 10 reps with Min A to Mod A for right side to achieve full range       HEP:  Access Code: [email or web address]  URL: [email or web address]  Date: 06/15/2023  Prepared by: Delmy Good    Exercises  - Seated March  - 1 x daily - 7 x weekly - 2 sets - 10 reps - 3 hold  - Seated Long Arc Quad  - 1 x daily - 7 x weekly - 2 sets - 10 reps - 3 hold  - Seated Trunk Rotation - Arms Crossed  - 1 x daily - 7 x weekly - 2 sets - 10 reps - 3 hold  - Seated Upright Posture Correction  - 1 x daily - 7 x weekly - 2 sets - 5 reps - 60 hold  - active weight shifting in wheelchair 1 x 10    Assessment:  Tolerated session fair this date with focus on standing tolerance and LE active movement. Patient continues to require mod A x 2 to achieve upright position - has significant trouble with poor terminal knee extension as well as unable to facilitate hip extension. Patient requires encouragement in order to stay standing and verbal and tactile cuing to stay upright. Relatively steady performance of standing tolerance compared to prior session. Patient was able to tolerate increased resistance with LAQ. Patient needed cues to for posture. . Handed off to OT post-session. Plan: Continue per plan of care. POC expires Auth Status Total   Visits  Start date  Expiration date PT/OT + Visit Limit?  Co-Insurance   9/5/23 Not req BOMN 6/13/23 9/5/23 no Yes                                         Visit/Unit Tracking  6/13 6/15 6/22 6/27 6/29 7/11

## 2023-07-13 ENCOUNTER — OFFICE VISIT (OUTPATIENT)
Age: 83
End: 2023-07-13
Payer: MEDICARE

## 2023-07-13 DIAGNOSIS — I69.359 CVA, OLD, HEMIPARESIS (HCC): ICD-10-CM

## 2023-07-13 DIAGNOSIS — R29.898 WEAKNESS OF BOTH LOWER EXTREMITIES: Primary | ICD-10-CM

## 2023-07-13 DIAGNOSIS — I63.9 CEREBROVASCULAR ACCIDENT (CVA), UNSPECIFIED MECHANISM (HCC): Primary | ICD-10-CM

## 2023-07-13 PROCEDURE — 97530 THERAPEUTIC ACTIVITIES: CPT

## 2023-07-13 PROCEDURE — 97112 NEUROMUSCULAR REEDUCATION: CPT

## 2023-07-13 NOTE — PROGRESS NOTES
Daily Note     Today's date: 2023  Patient name: Hilary Stevens  : 1940  MRN: [id number]  Referring provider: Alison Sidhu MD  Dx:   No diagnosis found. Subjective: Pt reports that she is doing well today. Objective: See treatment diary below  Sit to standing @ long bar: 1-2 minute rest break between each attempt to stand to allow recovery for max effort for next rep pre research standards. VCs: to remain upright, manual blocking of right knee with PT knee to hold knee extension. Rep 1: Mod A x 2 Duration of standing 45 seconds    Rep 2: Mod A x 2  25 seconds   Rep 3: Mod A x 2  Duration of standing 30 seconds   Rep 4: Max A x 2  Duration of standing 15 seconds   Rep 5: Max A x 2. Duration of standing 10 seconds    Seated LAQ    R LE: manual resistance with extension  and flexion 2 sets 10 reps, varying resistance to achieve full range    L LE: manual resistance with extension  2 sets 10 reps, 3 sec hold end range with 2lb ankle wt on left leg. Seated march:   R LE: active assistive to achieve full height and cuing for eccentric lowering to challenge hip flexor more, 2 sets x 10 reps    L LE: manual resistance throughout range with varying degree to allow full range     Seated hib abduction with TB 2 x 10   seated hip adduction with pillow 10 sec x 30  Seated glute squeezes 3 sec x 20 ea   Hamstring stretch 30 sec x 2     HEP:  Access Code: [email or web address]  URL: [email or web address]  Date: 06/15/2023  Prepared by: Jesse Kyle    Exercises  - Seated March  - 1 x daily - 7 x weekly - 2 sets - 10 reps - 3 hold  - Seated Long Arc Quad  - 1 x daily - 7 x weekly - 2 sets - 10 reps - 3 hold  - Seated Trunk Rotation - Arms Crossed  - 1 x daily - 7 x weekly - 2 sets - 10 reps - 3 hold  - Seated Upright Posture Correction  - 1 x daily - 7 x weekly - 2 sets - 5 reps - 60 hold  - active weight shifting in wheelchair 1 x 10    Assessment:  Tolerated session fair this date with focus on standing tolerance and LE active movement. Added in more lower extremity strengthening exercises as noted above. Patient continues to need mod A x 2 in order to achieve upright position. Patient requires verbal cues in order to stay upright and to look up. Patient tolerated added exercises well with good muscle activation. Patient was able to tolerate increased resistance with LAQ. Patient needed cues to for posture. Educated family and aide that patient needs to get her wheelchair lock fixed due to safety. Plan: Continue per plan of care. POC expires Auth Status Total   Visits  Start date  Expiration date PT/OT + Visit Limit?  Co-Insurance   9/5/23 Not req BOMN 6/13/23 9/5/23 no Yes                                         Visit/Unit Tracking  6/13 6/15 6/22 6/27 6/29 7/11

## 2023-07-13 NOTE — PROGRESS NOTES
POC expires Auth Status Total   Visits  Start date  Expiration date PT/OT + Visit Limit? Co-Insurance   23 BOMN     BOMN                                            Visit/Unit Tracking  AUTH Status:  Date         Visits  Authed:  Used 1(eval)  1 1 1 1 1 1         Remaining  23 22 21 20 19 18 17          PLAN OF CARE START:23  PLAN OF CARE END: 2023  PROGRESS NOTE DUE: 23 (progress note)   FREQUENCY: 1-2x/week for 8-12 weeks   PRECAUTIONS: 1:1 at all times, cog/aphasia, maintainence program     DIAGNOSIS: Chronic CVA with R side weakness   VISITS:  24       Daily Note     Today's date: 2023  Patient name: Moni Bernabe  : 1940  MRN: [id number]  Referring provider: Perlita Rapp MD  Dx:   Encounter Diagnosis   Name Primary? • Cerebrovascular accident (CVA), unspecified mechanism ([address]) Yes                    Subjective: Pt and caregiver reports no changes since last vision       Objective: See treatment below. TA:   *R UE PROM: seated in w/c into all planes and pivots to patient tolerance with 5-10 second hold at end range. Focused on decreased stiffness, increased ROM, and to promote motor recovery for increased functional use of R UE. Performed in prep for therapeutic activity. NMRE:   *performed the following seated in w/c this session. Focused on UE ROM, motor control, and coordination to promote motor recovery. Significant motor apraxia noted. -AAROM in gravity eliminated plane into the following motions seated tabletop: elbow flexion/extension, shoulder protraction/retraction, shoulder horizontal ab/adduction, x1 bout each for ~2-3 minutes each. Therapist assist to reach end range position and VC for initiation of all tasks.   -reach,grasp,release of large mushroom pegs board>towel on R side of table using R UE modified 3 point pinch.  Therapist assist for proximal UE stabilization and movement   -reach,grasp,release of large light weight can R<>L target using R UE gross cylindrical grasp. Therapist assist for proximal UE stabilization and movement      Assessment: Tolerated treatment well. Continue to educate caregivers on fixing w/c brakes for increased safety during sessions. Benefits form verbal encouragement throughout session. Trini Sorensen (2013) is a [facility] decision that clarified that Medicare will in fact cover skilled therapy services to maintain a patient’s current condition or prevent/slow further deterioration. Pt would benefit from continued OT services to address R UE AAROM, weight bearing, sitting balance, weight shifting, tone management, endurance/acitivty tolerance, basic direction following, sustained attention. Plan: Continued skilled OT per POC.

## 2023-07-18 ENCOUNTER — OFFICE VISIT (OUTPATIENT)
Age: 83
End: 2023-07-18
Payer: MEDICARE

## 2023-07-18 DIAGNOSIS — R29.898 WEAKNESS OF BOTH LOWER EXTREMITIES: Primary | ICD-10-CM

## 2023-07-18 DIAGNOSIS — I69.359 CVA, OLD, HEMIPARESIS (HCC): ICD-10-CM

## 2023-07-18 DIAGNOSIS — I63.9 CEREBROVASCULAR ACCIDENT (CVA), UNSPECIFIED MECHANISM (HCC): Primary | ICD-10-CM

## 2023-07-18 PROCEDURE — 97112 NEUROMUSCULAR REEDUCATION: CPT | Performed by: PHYSICAL THERAPIST

## 2023-07-18 PROCEDURE — 97112 NEUROMUSCULAR REEDUCATION: CPT

## 2023-07-18 NOTE — PROGRESS NOTES
Daily Note     Today's date: 2023  Patient name: Bert Corado  : 1940  MRN: [id number]  Referring provider: Duy Baires MD  Dx:   Encounter Diagnosis     ICD-10-CM    1. Weakness of both lower extremities  R29.898       2. CVA, old, hemiparesis ([address])  I69.359                      Subjective: Pt reports that she is doing well today. Daughter reports she was trying to get out of bed. Objective: See treatment diary below  Sit to standing @ long bar: 1-2 minute rest break between each attempt to stand to allow recovery for max effort for next rep pre research standards. VCs: to remain upright, manual blocking of right knee with PT knee to hold knee extension. Rep 1: Max to Mod  A x 1 Duration of standing 45 seconds    Rep 2: Max to Mod  A x 1 Duration of standing 37seconds   Rep 3: Max to Mod  A x 1 Duration of standing 41 seconds   Rep 4: Max to Mod  A x 1 Duration of standing 30 seconds   Rep 5: Max A x 2. Duration of standing 11 seconds   Rep 6: Max A x 2. Duration of standing 44 seconds    Seated LAQ    R LE: manual resistance with extension  and flexion 2 sets 10 reps, varying resistance to achieve full range    L LE: manual resistance with extension  2 sets 10 reps, 3 sec hold end range with 2lb ankle wt on left leg. Seated march:   R LE: active assistive to achieve full height and cuing for eccentric lowering to challenge hip flexor more, 2 sets x 10 reps    L LE: manual resistance throughout range with varying degree to allow full range     Seated hib abduction with TB 2 x 10   seated hip adduction with pillow 10 sec x 30  Hamstring stretch 30 sec x 2     HEP:  Access Code: [email or web address]  URL: [email or web address]  Date: 06/15/2023  Prepared by: Evi Higuera    Exercises  - Seated March  - 1 x daily - 7 x weekly - 2 sets - 10 reps - 3 hold  - Seated Long Arc Quad  - 1 x daily - 7 x weekly - 2 sets - 10 reps - 3 hold  - Seated Trunk Rotation - Arms Crossed  - 1 x daily - 7 x weekly - 2 sets - 10 reps - 3 hold  - Seated Upright Posture Correction  - 1 x daily - 7 x weekly - 2 sets - 5 reps - 60 hold  - active weight shifting in wheelchair 1 x 10    Assessment:  Tolerated session well this date with focus on standing tolerance and LE active movement. Pt required decreased physical assistance with standing with increased ability to participate this date. Increased physical assistance needed as fatigue progressed towards rep 5 and 6. Patient requires verbal cues in order to stay upright and to look up. Patient was able to tolerate increased resistance with LAQ. Patient needed cues to for posture. Educated family and aide that patient needs to get her wheelchair lock fixed due to safety. Plan: Continue per plan of care. POC expires Auth Status Total   Visits  Start date  Expiration date PT/OT + Visit Limit?  Co-Insurance   9/5/23 Not req BOMN 6/13/23 9/5/23 no Yes                                         Visit/Unit Tracking  6/13 6/15 6/22 6/27 6/29 7/11 7/18

## 2023-07-18 NOTE — PROGRESS NOTES
POC expires Auth Status Total   Visits  Start date  Expiration date PT/OT + Visit Limit? Co-Insurance   23 BOMN     BOMN                                            Visit/Unit Tracking  AUTH Status:  Date        Visits  Authed:  Used 1(eval)  1 1 1 1 1 1 1        Remaining  23 22 21 20 19 18 17 16         PLAN OF CARE START:23  PLAN OF CARE END: 2023  PROGRESS NOTE DUE: 23 (progress note)   FREQUENCY: 1-2x/week for 8-12 weeks   PRECAUTIONS: 1:1 at all times, cog/aphasia, maintainence program     DIAGNOSIS: Chronic CVA with R side weakness   VISITS:  24       Daily Note     Today's date: 2023  Patient name: Maureen Delacruz  : 1940  MRN: [id number]  Referring provider: Francie Murillo MD  Dx:   Encounter Diagnosis   Name Primary? • Cerebrovascular accident (CVA), unspecified mechanism ([address]) Yes                    Subjective: Pt and caregiver reports no changes since last vision       Objective: See treatment below. NMRE:   Performed the following seated in w/c this session. Focused on UE ROM, motor control, and coordination to promote motor recovery. Significant motor apraxia noted. *arm skate seated tabletop into the following motions focused on R UE motor control, coordination, improved ROM in gravity eliminated plane. VC for body mechanics, hand over hand assist throughout for control, initiation, and to reach end range   -shoulder horizontal ab/adduction   -shoulder protraction/retraction  -shoulder external/internal rotation   *ROM ARC-seated tabletop reach,grasp,release of disc R side of arc>L side using R UE with assist for grasp, initiation, and stabilization of proximal R UE for movement.  Pt then performed reach,grasp,release of disc R side of arc>L side using L UE via weight shifting and to promote sitting balance, x2 bouts with each UE total.     *R UE PROM: seated in w/c into all planes and pivots to patient tolerance with 5-10 second hold at end range. Focused on decreased stiffness, increased ROM, and to promote motor recovery for increased functional use of R UE. Performed in prep for therapeutic activity. Assessment: Tolerated treatment well. Benefits form verbal encouragement throughout session. Trini Sorensen (2013) is a [facility] decision that clarified that Medicare will in fact cover skilled therapy services to maintain a patient’s current condition or prevent/slow further deterioration. Pt would benefit from continued OT services to address R UE AAROM, weight bearing, sitting balance, weight shifting, tone management, endurance/acitivty tolerance, basic direction following, sustained attention. Plan: Continued skilled OT per POC.

## 2023-07-20 ENCOUNTER — EVALUATION (OUTPATIENT)
Age: 83
End: 2023-07-20
Payer: MEDICARE

## 2023-07-20 DIAGNOSIS — I63.9 CEREBROVASCULAR ACCIDENT (CVA), UNSPECIFIED MECHANISM (HCC): Primary | ICD-10-CM

## 2023-07-20 DIAGNOSIS — I69.359 CVA, OLD, HEMIPARESIS (HCC): ICD-10-CM

## 2023-07-20 DIAGNOSIS — R29.898 WEAKNESS OF BOTH LOWER EXTREMITIES: Primary | ICD-10-CM

## 2023-07-20 PROCEDURE — 97112 NEUROMUSCULAR REEDUCATION: CPT | Performed by: PHYSICAL THERAPIST

## 2023-07-20 PROCEDURE — 97112 NEUROMUSCULAR REEDUCATION: CPT

## 2023-07-20 NOTE — PROGRESS NOTES
POC expires Auth Status Total   Visits  Start date  Expiration date PT/OT + Visit Limit? Co-Insurance   23 BOMN     BOMN                                            Visit/Unit Tracking  AUTH Status:  Date        Visits  Authed:  Used 1(eval)  1 1 1 1 1 1 1 1 (progress note)        Remaining  23 22 21 20 19 18 17 16 15        PLAN OF CARE START:23  PLAN OF CARE END: 2023  PROGRESS NOTE DUE: 23 (progress note)   FREQUENCY: 1-2x/week for 8-12 weeks   PRECAUTIONS: 1:1 at all times, cog/aphasia, maintainence program     DIAGNOSIS: Chronic CVA with R side weakness   VISITS:        Daily Note     Today's date: 2023  Patient name: Barbie Lee  : 1940  MRN: [id number]  Referring provider: Janak Diana MD  Dx:   Encounter Diagnosis   Name Primary? • Cerebrovascular accident (CVA), unspecified mechanism ([address]) Yes                    Subjective: Pt and caregiver reports no changes since last vision       Objective: See treatment below. NMRE:   Impairment Observations: Assessed the following this session to assess progress in therapy: AROM seated, PROM seated, coordination, and orientation skills. All other observations/assessments carried forward from previous evaluation. See below for goal updates.        CLAIRE ARCHER Comments           UPPER EXTREMITY FUNCTION   Impaired Impaired Dominant Hand: R UE     AROM (Seated)      Small space noted in R UE middle aspect of joint  (improved)     R UE AROM tested only   Elevation <1/2 range  Near full  Remained    Shoulder FF <1/2 range   3/4 range   remained   Shoulder Ext <1/2 range   3/4 range   Remained    Shoulder Abd <1/2 range   3/4 range   Remained    Shoulder Add <1/2 range   1/2 range   Remained    Horizontal Abd <1/2 range   1/2 range   Remained    Horizontal Add <1/2 range   Near full   Remained    External rotation  <1/2 range  Near full  Remained    Elbow Flex 1/2 range  Near full Remained    Elbow Ext 1/2 range  Near full   Remained   Pronation <1/2 range   Near full   Remained    Supination <1/2 range  Near full   Remained    Wrist Flex 3/4  range   Full   improved   Wrist Ext 3/4 range  Full   improved   Digit Flex 3/4 range   full  Remained    Digit Ex Near full   Full   improved   Composite Grasp 3/4 range   Full   Remained      PROM (seated position)    Right side tested only        Shoulder FF 3/4 range  Remained    Shoulder Ext 3/4 range   Remained    Shoulder ABD 3/4 range   Remained    Shoulder ADD 3/4 range   Remained    Horizontal ABD Near full   Improved    Horizontal ADD Near full    improved   Elbow Flex Near full   Spasticity noted; improved    Elbow Ext 3/4 range   Remained    Wrist Flex Full   Improved    Wrist Ext Full   Improved    Digit Flex full  Remained    Digit Ext Full   Remained    Supination 3/4 range   Remained    Pronation Near full   Improved      COORDINATION      Opposition Unable  Impaired  Remained    Finger to Nose Unable  Impaired   difficulty with command following; remained      TONE: MODIFIED EMMANUEL SCALE      No increase in muscle tone (0)      Slight Increase in muscle tone with catch and release or min resist at end range (1)      Slight Increase in muscle tone with catch and release, followed by min resistance through remainder of range (1+) Elbow flexors, shoulder flexors   Remained    Increased muscle tone through full range, able to be moved easily (2)      Considerable increase in tone, difficult to move (3)      Rigid in Flexion/Extension (4)               TEMPORAL ORIENTATION:   Year: accurate (improved)   Month: accurate (remained)   Day of the week: (remained)  Date: accurate (remained)   Type of facility: accurate (remained)   Location of facility: accurate (remained)     SITTING BALANCE: up to max A (not tested)     *R UE PROM: seated in w/c into all planes and pivots to patient tolerance with 5-10 second hold at end range.  Focused on decreased stiffness, increased ROM, and to promote motor recovery for increased functional use of R UE. Performed in prep for therapeutic activity. *reach,grasp,release of small foam blocks R side target>L side target using R UE gross grasp/release pattern. Performed seated tabletop. Pt required physical assist to stabilize UE and perform proximal movement. Visual cues and increased time for gross grasp/release. x2 bouts total.      Assessment: Tolerated treatment well. Slight improvement in AROM and PROM of R UE noted. Pt achieved 3 STGs and continues to make fair progress toward all other goals. 1 goal discontinued due to environmental restrictions. Pt caregivers have demonstrated fair-good understanding of all HEPs throughout program. Benefits form verbal encouragement throughout session. Continues to demonstrate apraxic movement patterns during task performance. Trini Sorensen (2013) is a [facility] decision that clarified that Medicare will in fact cover skilled therapy services to maintain a patient’s current condition or prevent/slow further deterioration. Pt would benefit from continued OT services to address R UE AAROM, weight bearing, sitting balance, weight shifting, tone management, endurance/acitivty tolerance, basic direction following, sustained attention.      SHORT TERM GOALS (4-6 weeks)    GOAL  STATUS ON IE  GOAL STATUS    Caregiver will demo good carryover of clinic and home tone reduction strategies for maintained AROM initiation with functional reach with ADL fxn 1+ ACHIEVED          Caregivers will demo G carryover of Home Exercise Program to improve functional progression towards goals in plan of care and for improved functional use of UE  ACHIEVED        Pt will tolerate supine and/or seated exercise x 8 minutes with minimal rest breaks required for maintained engagement in life roles and weekly exercise regimen   PROGRESSING, CONTINUE GOAL        Pt will demo ability to tolerate SS position EOM with mod-max A for sitting balance while performing therapeutic activity of choice in order to maintain core stability for no less then 10 minutes  Max A  ACHIEVED        LONG TERM GOALS( 8-12 weeks)    GOAL  STATUS ON IE  GOAL STATUS    Pt will be oriented x4 100% of the time for 5/5 trials using external cues as needed  x3  PROGRESSING, CONTINUE GOAL        Pt will tolerate supine and/or seated exercise x 10 minutes with minimal rest breaks required for maintained engagement in life roles and weekly exercise regimen   PROGRESSING, CONTINUE GOAL        Pt will demo ability to follow 1-2 step commands with 100% accuracy for 3/5 trials for maintained engagement in salient tasks  PROGRESSING, CONTINUE GOAL        Caregivers will demo and/or verbalize understanding of use of recommended DME and/or AE for increased patient safety   PROGRESSING, CONTINUE GOAL        Caregivers will demo G comprehension of safe transfer and bed mobility techniques to maintain safety during ADLs and mobility   DISCONTINUED due to environmental  limitations              Plan: Continued skilled OT per POC.

## 2023-07-20 NOTE — PROGRESS NOTES
PT Re-Evaluation          POC expires Auth Status Total   Visits  Start date  Expiration date PT/OT + Visit Limit? Co-Insurance   23 Not req BOMN 23 no Yes                                         Visit/Unit Tracking  6/13 6/15 6/22 6/27 6/29 7/11 7/18 7/20                                            Today's date: 2023  Patient name: Yeni Reyes  : 1940  MRN: [id number]  Referring provider: Horace Gonzalez MD  Dx:   Encounter Diagnosis     ICD-10-CM    1. Weakness of both lower extremities  R29.898       2. CVA, old, hemiparesis [facility])  I69.359             Assessment  Assessment details: Patient is a 80 y.o. Female who presents to skilled outpatient PT with chronic CVA (early ). Upon re- evaluation, pt has R-sided hemiparesis/hemiplegia from CVA with continuing with dec LE strength, poor posture, dec sitting balance, dec sensation in RLE, dec activity tolerance and endurance. Completed 4 bouts of standing with 1 person Max to Mod A for 3/4 reps with longest duration of standing being 60 seconds. Manual blocking of feet to ensure proper engagement of quads vs hip extensors to stand which patient has tendency to display. Patient does have issues with motivation and participation during session at times. Pt will continue to  benefit from skilled OP PT to maximize functional mobility independence and dec risk for falls. Will see pt 2x/week. Significant amount of time spent completing patient education about bracing, LE strengthening HEP, and POC. Pt and caregivers agreeable.        Impairments: Abnormal coordination, Abnormal gait, Abnormal muscle tone, Abnormal or restricted ROM, Activity intolerance, Impaired balance, Impaired physical strength, Lacks appropriate HEP, Poor posture, Poor body mechanics, Pain with function, Safety issue, Weight-bearing intolerance, Abnormal movement, Difficulty understanding, Abnormal muscle firing  Understanding of Dx/Px/POC: Good  Prognosis: Good    Patient verbalized understanding of POC. Please contact me if you have any questions or recommendations. Thank you for the referral and the opportunity to share in [address] [place] [address]. Plan  Plan details: Will see pt 2x/week  Patient would benefit from: PT Eval, Skilled PT, OT Eval and Speech Eval  Planned modality interventions: Biofeedback, Cryotherapy, TENS, Thermotherapy  Planned therapy interventions: Abdominal trunk stabilization, ADL training, Balance, Balance/WB training, Breathing training, Body mechanics training, Coordination, Functional ROM exercises, Gait training, HEP, Joint Mobilization, Manual Therapy, Gloria taping, Motor coordination training, Neuromuscular re-education, Patient education, Postural training, Strengthening, Stretching, Therapeutic activities, Therapeutic exercises, Therapeutic training, Transfer training, Activity modification, Work reintegration  Frequency: 2x/wk  Duration in weeks: 12  Plan of Care beginning date: 6/13/23  Plan of Care expiration date: 12 weeks - 10/12/2023  Treatment plan discussed with: Patient and caretakers     Goals  Short Term Goals (4 weeks):    - Patient will be independent in basic HEP 2-3 weeks  - Patient will improve 5xSTS score by 2.3 seconds to promote improved LE functional strength needed for ADLs  - Patient will demo standing tolerance to 60s MET with Mod to Max A x 1 person at Long bar and UE support.    - Pt will improve SIS to 230   - Pt will improve STS to mod Ax1 Progressing    Long Term Goals (12 weeks):  - Patient will be independent in a comprehensive home exercise program  - Patient will complete SCOTT to facilitate return to safe independent ambulation  - Patient will stand [place] [facility] [place]  - Patient will improve SIS to 250  - Pt will complete slide board transfer with Mod Ax1 to demo improved independence and dec caregiver burden    Subjective  History of Present Illness  Mechanism of injury: Pt reports 2022 CVA using letty lift and dependent at home. She is not doing any standing at home, completed leg strengthening exercises in chair. She has been receiving PT/OT at [facility] for the past year. Reports ability to stand utilizing standing frame, has not completed without. Goal is to get better with transfers and walk. She is accompanied by two caretakers. Spoke with daughter on phone, will get [facility] rehab notes faxed over for more information of current status. Primary AD: none/letty dependent  Assist level at home: dependent   WC usage: primary   PLOF: independent before CVA    Pain  Current pain rating: none  Hand dominance: R handed    Objective   LE MMT  - R Hip Flexion: 3-/5  - L Hip Flexion: 3-/5  - R Knee Extension: 3-/5 - L Knee Extension: 3+/5  - R Ankle DF: 1/5  - L Ankle DF: 3-/5  - R Ankle PF: 0/5  - L Ankle PF: 1+/5    Sensation  - Light touch: WFL  - Pt reports dec sensation in RLE & RUE    Modified Kush  - R knee extension: 0 - no increase in tone  - L knee extension: 0 - no increase in tone  - R knee flexion: 0 - no increase in tone  - L knee flexion: 0 - no increase in tone  - R ankle DF: 0 - no increase in tone   - L ankle DF: 0 - no increase in tone         Outcome Measures Initial Eval  6/13 scores   SIS *caregiver completed physical 10    Memory & thinking 29    Mood and emotions 41    communication 30    ADLs 17    mobility 13    hand 9    Activities/meaning 41    How much recovered since CVA 30    TOTAL SCORE: 220/395        Precautions: CVA, awaiting fax of [facility] notes of PMHx  No past medical history on file. Daily interventions  Sit to standing @ long bar: 3-5 minute rest break between each attempt to stand to allow recovery for max effort for next rep pre research standards. VCs: to remain upright, manual blocking of right knee with PT knee to hold knee extension.     Rep 1: Max to Mod  A x 1 Duration of standing 57 seconds    Rep 2: Max to Mod  A x 1 Duration of standing 45 seconds   Rep 3: Max to Mod  A x 1 Duration of standing 60 seconds   Rep 4: Max to Mod  A x 1 Duration of standing 37 seconds     Seated LAQ    R LE: manual resistance with extension  and flexion 2 sets 10 reps, varying resistance to achieve full range    L LE: manual resistance with extension  2 sets 10 reps, 3 sec hold end range with 2lb ankle wt on left leg.      Seated march:   R LE: active assistive to achieve full height and cuing for eccentric lowering to challenge hip flexor more, 2 sets x 10 reps    L LE: manual resistance throughout range with varying degree to allow full range     Seated hib abduction with TB 2 x 10   seated hip adduction with pillow 10 sec x 30  Hamstring stretch 30 sec x 2

## 2023-07-25 ENCOUNTER — OFFICE VISIT (OUTPATIENT)
Age: 83
End: 2023-07-25
Payer: MEDICARE

## 2023-07-25 DIAGNOSIS — R29.898 WEAKNESS OF BOTH LOWER EXTREMITIES: Primary | ICD-10-CM

## 2023-07-25 DIAGNOSIS — I63.9 CEREBROVASCULAR ACCIDENT (CVA), UNSPECIFIED MECHANISM (HCC): Primary | ICD-10-CM

## 2023-07-25 DIAGNOSIS — I69.359 CVA, OLD, HEMIPARESIS (HCC): ICD-10-CM

## 2023-07-25 PROCEDURE — 97112 NEUROMUSCULAR REEDUCATION: CPT

## 2023-07-25 PROCEDURE — 97112 NEUROMUSCULAR REEDUCATION: CPT | Performed by: PHYSICAL THERAPIST

## 2023-07-25 NOTE — PROGRESS NOTES
Daily Note     Today's date: 2023  Patient name: Rasheed Issa  : 1940  MRN: [id number]  Referring provider: Suzanna Patrick MD  Dx:   Encounter Diagnosis     ICD-10-CM    1. Weakness of both lower extremities  R29.898       2. CVA, old, hemiparesis ([address])  I69.359                      Subjective: Pt reports that she is doing so so, rain got her a little wet when getting out. Objective: See treatment diary below  Sit to standing @ long bar: 3-5 minute rest break between each attempt to stand to allow recovery for max effort for next rep pre research standards. VCs: to remain upright, manual blocking of right knee with PT knee to hold knee extension. Rep 1: Max to Mod  A x 1 Duration of standing 30 seconds    Rep 2: Max to Mod  A x 1 Duration of standing 44 seconds   Rep 3: Max  A x 1 Duration of standing 32 seconds   Rep 4: Max  A x 1 Duration of standing 23 seconds; decline to stand further. Seated LAQ    R LE: manual resistance with extension  and flexion 2 sets 10 reps, varying resistance to achieve full range    L LE: manual resistance with extension  2 sets 10 reps, 3 sec hold end range with 2lb ankle wt on left leg. Seated march:   R LE: active assistive to achieve full height and cuing for eccentric lowering to challenge hip flexor more, 2 sets x 10 reps    L LE: manual resistance throughout range with varying degree to allow full range     Seated hib abduction with TB 2 x 10   seated hip adduction with pillow 10 sec x 30  Hamstring stretch 30 sec x 2     HEP:  Access Code: [email or web address]  URL: [email or web address]  Date: 06/15/2023  Prepared by: Paco Rudolph    Exercises  - Seated March  - 1 x daily - 7 x weekly - 2 sets - 10 reps - 3 hold  - Seated Long Arc Quad  - 1 x daily - 7 x weekly - 2 sets - 10 reps - 3 hold  - Seated Trunk Rotation - Arms Crossed  - 1 x daily - 7 x weekly - 2 sets - 10 reps - 3 hold  - Seated Upright Posture Correction  - 1 x daily - 7 x weekly - 2 sets - 5 reps - 60 hold  - active weight shifting in wheelchair 1 x 10    Assessment:  Tolerated session well this date with focus on standing tolerance and LE active movement. Pt required increased physical assistance with standing only 4 times this date. Refused to stand after 4th attempt. Max encouragement provided. Conducted seated exercises and attempt to stand again towards end of session with patient refusing. Patient needed cues to for posture. Educated family and aide that patient needs to get her wheelchair lock fixed due to safety. Plan: Continue per plan of care. POC expires Auth Status Total   Visits  Start date  Expiration date PT/OT + Visit Limit?  Co-Insurance   9/5/23 Not req BOMN 6/13/23 9/5/23 no Yes                                         Visit/Unit Tracking  6/13 6/15 6/22 6/27 6/29 7/11 7/18 7/20 7/25

## 2023-07-25 NOTE — PROGRESS NOTES
POC expires Auth Status Total   Visits  Start date  Expiration date PT/OT + Visit Limit? Co-Insurance   23 BOMN     BOMN                                            Visit/Unit Tracking  AUTH Status:  Date 6/13 6/22 6/27 6/29 7/6 7/11 7/13 7/18 7/20  7/25     Visits  Authed:  Used 1(eval)  1 1 1 1 1 1 1 1 (progress note)  1      Remaining  23 22 21 20 19 18 17 16 15 14       PLAN OF CARE START:23  PLAN OF CARE END: 2023  PROGRESS NOTE DUE: 23 (progress note)   FREQUENCY: 1-2x/week for 8-12 weeks   PRECAUTIONS: 1:1 at all times, cog/aphasia, maintainence program     DIAGNOSIS: Chronic CVA with R side weakness   VISITS: 10 of 24       Daily Note     Today's date: 2023  Patient name: Tona Muñoz  : 1940  MRN: [id number]  Referring provider: Julia Quinonez MD  Dx:   Encounter Diagnosis   Name Primary? • Cerebrovascular accident (CVA), unspecified mechanism ([address]) Yes       Start Time: 1501  Stop Time: 1545  Total time in clinic (min): 44 minutes    Subjective: Pt and caregiver reports no changes since last vision       Objective: See treatment below. NMRE:   *R UE PROM: seated in w/c into all planes and pivots to patient tolerance with 5-10 second hold at end range. Focused on decreased stiffness, increased ROM, and to promote motor recovery for increased functional use of R UE. Performed in prep for therapeutic activity. *reach,grasp,release of small foam blocks R side target>L side target using R UE gross grasp/release pattern. Performed seated tabletop. Utilized saebo MAS to assist with proximal stability and ROM this session. Visual cues and increased time for gross grasp/release. *vibration to R UE on ball, seated in w/c focused on decreased stiffness, ROM, motor and sensory recovery in order to facilitate functional movement with R UE. Pt reports feeling vibration in hand and distal  is session. Tolerated for 8 minutes with no adverse affects.      Assessment: Tolerated treatment well. Benefits form verbal encouragement throughout session. Continues to demonstrate apraxic movement patterns during task performance. Trini Sorensen (2013) is a [facility] decision that clarified that Medicare will in fact cover skilled therapy services to maintain a patient’s current condition or prevent/slow further deterioration. Pt would benefit from continued OT services to address R UE AAROM, weight bearing, sitting balance, weight shifting, tone management, endurance/acitivty tolerance, basic direction following, sustained attention. Plan: Continued skilled OT per POC.

## 2023-07-27 ENCOUNTER — OFFICE VISIT (OUTPATIENT)
Age: 83
End: 2023-07-27
Payer: MEDICARE

## 2023-07-27 DIAGNOSIS — I63.9 CEREBROVASCULAR ACCIDENT (CVA), UNSPECIFIED MECHANISM (HCC): Primary | ICD-10-CM

## 2023-07-27 DIAGNOSIS — R29.898 WEAKNESS OF BOTH LOWER EXTREMITIES: Primary | ICD-10-CM

## 2023-07-27 DIAGNOSIS — I69.359 CVA, OLD, HEMIPARESIS (HCC): ICD-10-CM

## 2023-07-27 PROCEDURE — 97530 THERAPEUTIC ACTIVITIES: CPT

## 2023-07-27 PROCEDURE — 97112 NEUROMUSCULAR REEDUCATION: CPT

## 2023-07-27 NOTE — PROGRESS NOTES
Daily Note     Today's date: 2023  Patient name: Jillian Frank  : 1940  MRN: [id number]  Referring provider: Ean Doherty MD  Dx:   Encounter Diagnosis     ICD-10-CM    1. Weakness of both lower extremities  R29.898       2. CVA, old, hemiparesis ([address])  I69.359                      Subjective: Pt reports that she is doing well. Objective: See treatment diary below  Seated LAQ    R LE: manual resistance with extension  and flexion 4 sets 10 reps, varying resistance to achieve full range    L LE: manual resistance with extension  4 sets 10 reps, 3 sec hold end range with 2lb ankle wt on left leg. Seated march:   R LE: active assistive to achieve full height and cuing for eccentric lowering to challenge hip flexor more, 2 sets x 10 reps    L LE: manual resistance throughout range with varying degree to allow full range     Seated hib abduction with TB 2 x 10   seated hip adduction with pillow 10 sec x 30  Contract relax with hamstrings 5 sec x 10 ea   Contract relax with calf muscle 5 sec x 10 ea     Sit to standing @ long bar: 3-5 minute rest break between each attempt to stand to allow recovery for max effort for next rep pre research standards. - deferred to next time   VCs: to remain upright, manual blocking of right knee with PT knee to hold knee extension. Rep 1: Max to Mod  A x 1 Duration of standing 30 seconds    Rep 2: Max to Mod  A x 1 Duration of standing 44 seconds   Rep 3: Max  A x 1 Duration of standing 32 seconds   Rep 4: Max  A x 1 Duration of standing 23 seconds; decline to stand further. HEP:  Access Code: [email or web address]  URL: [email or web address]  Date: 06/15/2023  Prepared by: Oz Moya    Exercises  - Seated March  - 1 x daily - 7 x weekly - 2 sets - 10 reps - 3 hold  - Seated Long Arc Quad  - 1 x daily - 7 x weekly - 2 sets - 10 reps - 3 hold  - Seated Trunk Rotation - Arms Crossed  - 1 x daily - 7 x weekly - 2 sets - 10 reps - 3 hold  - Seated Upright Posture Correction  - 1 x daily - 7 x weekly - 2 sets - 5 reps - 60 hold  - active weight shifting in wheelchair 1 x 10    Assessment:  Tolerated session well this date with focused on LE strengthening. Increased LE exercises repetitions. Patient does fatigue quickly near the end of her reps. Patient does require encouragement to continue to participate in session. Patient did well with contract relax techniques. Plan: Continue per plan of care. POC expires Auth Status Total   Visits  Start date  Expiration date PT/OT + Visit Limit?  Co-Insurance   9/5/23 Not req BOMN 6/13/23 9/5/23 no Yes                                         Visit/Unit Tracking  6/13 6/15 6/22 6/27 6/29 7/11 7/18 7/20 7/25 7/27

## 2023-07-27 NOTE — PROGRESS NOTES
POC expires Auth Status Total   Visits  Start date  Expiration date PT/OT + Visit Limit? Co-Insurance   23 BOMN     BOMN                                            Visit/Unit Tracking  AUTH Status:  Date          Visits  Authed:  Used 1(eval)  1 1 1 1 1 1 1 1 (progress note)  1 1          Remaining  23 25 21 20 19 18 17 16 15 14 13           PLAN OF CARE START:23  PLAN OF CARE END: 2023  PROGRESS NOTE DUE: 23 (progress note)   FREQUENCY: 1-2x/week for 8-12 weeks   PRECAUTIONS: 1:1 at all times, cog/aphasia, maintainence program     DIAGNOSIS: Chronic CVA with R side weakness   VISITS:        Daily Note     Today's date: 2023  Patient name: Tona Muñoz  : 1940  MRN: [id number]  Referring provider: Julia Quinonez MD  Dx:   Encounter Diagnosis   Name Primary? • Cerebrovascular accident (CVA), unspecified mechanism ([address]) Yes                  Subjective: Pt and caregiver reports no changes since last visit. Objective: See treatment below. NMRE:   *R UE PROM: seated in w/c into all planes and pivots to patient tolerance with 5-10 second hold at end range. Focused on decreased stiffness, increased ROM, and to promote motor recovery for increased functional use of R UE. Performed in prep for therapeutic activity. *Seated in w/c pt able to remove squigs mirror>L side of tabletop using L UE, focus on anterior weight shifting. Pt then able to place squigs tabletop on L side>mirror with L UE focus on lateral and anterior weight shifting. Hand over hand assist for force production to place squig on mirror and up to mod A for anterior weight shifting. Increased time and VC for encouragement. *removal of squigs from mirror anteriorly>therapists hand on L side using R UE gross grasp. Pt required hand over hand assist for stabilization of proximal R UE and max VC and visual cues for grasp/release pattern. Apraxia noted throughout. TA:   Temporal orientation assessed this session via field of 3 options and pointing secondary to expressive aphasia. See below for results:   Year: inaccurate   Month: accurate   Day of the week: accurate  Date: accurate  Type of facility: accurate   Location of facility: accurate     Assessment: Tolerated treatment well. Benefits form verbal encouragement throughout session. Continues to demonstrate apraxic movement patterns during task performance. Trini vsDeneen Sorensen (2013) is a [facility] decision that clarified that Medicare will in fact cover skilled therapy services to maintain a patient’s current condition or prevent/slow further deterioration. Pt would benefit from continued OT services to address R UE AAROM, weight bearing, sitting balance, weight shifting, tone management, endurance/acitivty tolerance, basic direction following, sustained attention. Plan: Continued skilled OT per POC.

## 2023-08-01 ENCOUNTER — OFFICE VISIT (OUTPATIENT)
Age: 83
End: 2023-08-01
Payer: MEDICARE

## 2023-08-01 ENCOUNTER — APPOINTMENT (OUTPATIENT)
Age: 83
End: 2023-08-01
Payer: MEDICARE

## 2023-08-01 DIAGNOSIS — I69.359 CVA, OLD, HEMIPARESIS (HCC): ICD-10-CM

## 2023-08-01 DIAGNOSIS — R29.898 WEAKNESS OF BOTH LOWER EXTREMITIES: Primary | ICD-10-CM

## 2023-08-01 PROCEDURE — 97112 NEUROMUSCULAR REEDUCATION: CPT

## 2023-08-01 NOTE — PROGRESS NOTES
Daily Note     Today's date: 2023  Patient name: Jillian Frank  : 1940  MRN: [id number]  Referring provider: Ean Doherty MD  Dx:   Encounter Diagnosis     ICD-10-CM    1. Weakness of both lower extremities  R29.898       2. CVA, old, hemiparesis ([address])  I69.359                      Subjective: Pt reports that she is doing well. Objective: See treatment diary below  Seated LAQ    R LE: manual resistance with extension  and flexion 4 sets 10 reps, varying resistance to achieve full range    L LE: manual resistance with extension  4 sets 10 reps, 3 sec hold end range with 2lb ankle wt on left leg. Seated march:   R LE: active assistive to achieve full height and cuing for eccentric lowering to challenge hip flexor more, 2 sets x 10 reps    L LE: manual resistance throughout range with varying degree to allow full range     Seated resisted hip abduction  2 x 10   seated hip adduction with pillow 10 sec x 30  Contract relax with hamstrings 5 sec x 10 ea   Contract relax with calf muscle 5 sec x 10 ea   trialed standing in // bars 4 attempts    Sit to standing @ long bar: 3-5 minute rest break between each attempt to stand to allow recovery for max effort for next rep pre research standards. - deferred to next time   VCs: to remain upright, manual blocking of right knee with PT knee to hold knee extension. Rep 1: Max to Mod  A x 1 Duration of standing 30 seconds    Rep 2: Max to Mod  A x 1 Duration of standing 44 seconds   Rep 3: Max  A x 1 Duration of standing 32 seconds   Rep 4: Max  A x 1 Duration of standing 23 seconds; decline to stand further. HEP:  Access Code: [email or web address]  URL: [email or web address]  Date: 06/15/2023  Prepared by: Oz Moya    Exercises  - Seated March  - 1 x daily - 7 x weekly - 2 sets - 10 reps - 3 hold  - Seated Long Arc Quad  - 1 x daily - 7 x weekly - 2 sets - 10 reps - 3 hold  - Seated Trunk Rotation - Arms Crossed  - 1 x daily - 7 x weekly - 2 sets - 10 reps - 3 hold  - Seated Upright Posture Correction  - 1 x daily - 7 x weekly - 2 sets - 5 reps - 60 hold  - active weight shifting in wheelchair 1 x 10    Assessment:  Tolerated session well this date with focused on LE strengthening. Patient demonstrates good carry over with exercises. Attempted standing with patient in // bars. Patient was unable to stand. Patient continues to require encouragement to participate. Patient continues to fatigue quickly and requires frequent rest breaks. Patient ended session feeling fatigued. Continue to try and stand patient next visit. Plan: Continue per plan of care. POC expires Auth Status Total   Visits  Start date  Expiration date PT/OT + Visit Limit?  Co-Insurance   9/5/23 Not req BOMN 6/13/23 9/5/23 no Yes                                         Visit/Unit Tracking  6/13 6/15 6/22 6/27 6/29 7/11 7/18 7/20 7/25 7/27 8/1

## 2023-08-03 ENCOUNTER — OFFICE VISIT (OUTPATIENT)
Age: 83
End: 2023-08-03
Payer: MEDICARE

## 2023-08-03 DIAGNOSIS — I69.359 CVA, OLD, HEMIPARESIS (HCC): ICD-10-CM

## 2023-08-03 DIAGNOSIS — I63.9 CEREBROVASCULAR ACCIDENT (CVA), UNSPECIFIED MECHANISM (HCC): Primary | ICD-10-CM

## 2023-08-03 DIAGNOSIS — R29.898 WEAKNESS OF BOTH LOWER EXTREMITIES: Primary | ICD-10-CM

## 2023-08-03 PROCEDURE — 97112 NEUROMUSCULAR REEDUCATION: CPT

## 2023-08-03 PROCEDURE — 97112 NEUROMUSCULAR REEDUCATION: CPT | Performed by: PHYSICAL THERAPIST

## 2023-08-03 NOTE — PROGRESS NOTES
POC expires Auth Status Total   Visits  Start date  Expiration date PT/OT + Visit Limit? Co-Insurance   23 BOMN     BOMN                                            Visit/Unit Tracking  AUTH Status:  Date  8/3        Visits  Authed:  Used 1(eval)  1 1 1 1 1 1 1 1 (progress note)  1 1 1         Remaining  23 22 21 20 19 18 17 16 15 14 13 12          PLAN OF CARE START:23  PLAN OF CARE END: 2023  PROGRESS NOTE DUE: 23 (progress note)   FREQUENCY: 1-2x/week for 8-12 weeks   PRECAUTIONS: 1:1 at all times, cog/aphasia, maintainence program     DIAGNOSIS: Chronic CVA with R side weakness   VISITS: 12 of 24       Daily Note     Today's date: 8/3/2023  Patient name: Deon Benítez  : 1940  MRN: [id number]  Referring provider: Kiarra Aguilar MD  Dx:   Encounter Diagnosis   Name Primary? • Cerebrovascular accident (CVA), unspecified mechanism ([address]) Yes                  Subjective: Pt and caregiver reports no changes since last visit. Objective: See treatment below. NMRE:   *R UE PROM: seated in w/c into all planes and pivots to patient tolerance with 5-10 second hold at end range. Focused on decreased stiffness, increased ROM, and to promote motor recovery for increased functional use of R UE. Performed in prep for therapeutic activity. *blaze pod tap, x2 placed on R and L side of patient tabletop. Instructed to tap with L hand vis ipsilateral and contralateral reaching with L UE. Attempted to perform with R UE, however unable at this time. VC for technique, max A for anterior weight shifting, and VC for force production to hit pod. *reach,grasp,release of [place] tabletop>bin using R UE gross grasp. Pt required max A for all components of task, limited success noted. Task ended due to difficulty with weight shifting, gross grasp/release patterns. Significant apraxia noted.      *active bilateral shoulder shrugs/elevation, x12 reps total. Therapist verbal cues and visual cues for performance. Increased time and 1/2-3/4 active range of R UE    Assessment: Tolerated treatment fair. Noted increased fatigue and increased difficulty with reach/grasp/release tasks. Benefits form verbal encouragement throughout session. Continues to demonstrate apraxic movement patterns during task performance. Trini vsDeneen Sorensen (2013) is a [facility] decision that clarified that Medicare will in fact cover skilled therapy services to maintain a patient’s current condition or prevent/slow further deterioration. Pt would benefit from continued OT services to address R UE AAROM, weight bearing, sitting balance, weight shifting, tone management, endurance/acitivty tolerance, basic direction following, sustained attention. Plan: Continued skilled OT per POC.

## 2023-08-03 NOTE — PROGRESS NOTES
Daily Note     Today's date: 8/3/2023  Patient name: Bassem Varela  : 1940  MRN: [id number]  Referring provider: Tabitha Burton MD  Dx:   Encounter Diagnosis     ICD-10-CM    1. Weakness of both lower extremities  R29.898       2. CVA, old, hemiparesis ([address])  I69.359                      Subjective: Pt reports that she is doing well. Objective: See treatment diary below    Sit to standing @ long bar: 3-5 minute rest break between each attempt to stand to allow recovery for max effort for next rep pre research standards. VCs: to remain upright, manual blocking of right knee with PT knee to hold knee extension. Rep 1: Max A x 1 Duration of standing 5 seconds, refused to attempt again     Rep 2: Max A x 1, 3 attempts to stand without success    Rep 3: Max  A x 1, 2 attempts to stand with patient refusing to attempt further    Rep 4: Pt refused to attempted 4th attempted. Switched to seated exercises. Seated LAQ    R LE: manual resistance with extension  and flexion 2 sets 10 reps, varying resistance to achieve full range    L LE: manual resistance with extension  2 sets 10 reps, 3 sec hold end range with 2lb ankle wt on left leg. Seated march:   R LE: active assistive to achieve full height and cuing for eccentric lowering to challenge hip flexor more, 2 sets x 10 reps    L LE: manual resistance throughout range with varying degree to allow full range     Seated resisted hip abduction  2 x 10   seated hip adduction with pillow 10 sec x 30     HEP:  Access Code: [email or web address]  URL: [email or web address]  Date: 06/15/2023  Prepared by: Neal Villalba    Exercises  - Seated March  - 1 x daily - 7 x weekly - 2 sets - 10 reps - 3 hold  - Seated Long Arc Quad  - 1 x daily - 7 x weekly - 2 sets - 10 reps - 3 hold  - Seated Trunk Rotation - Arms Crossed  - 1 x daily - 7 x weekly - 2 sets - 10 reps - 3 hold  - Seated Upright Posture Correction  - 1 x daily - 7 x weekly - 2 sets - 5 reps - 60 hold  - active weight shifting in wheelchair 1 x 10    Assessment:  Tolerated session well this date with focused on LE strengthening. Patient demonstrates good carry over with exercises. Attempted standing with patient at long bar as usual, Max A x 1 person with 1 successful attempt with standing and 3 other attempts no participation from patient. Directions to pull with arms and push with legs was unsuccessful. Patient was unable to stand. Patient continues to require encouragement to participate. Patient continues to fatigue quickly and requires frequent rest breaks. Patient ended session feeling fatigued. Continue to try and stand patient next visit. Plan: Continue per plan of care. POC expires Auth Status Total   Visits  Start date  Expiration date PT/OT + Visit Limit?  Co-Insurance   9/5/23 Not req BOMN 6/13/23 9/5/23 no Yes                                         Visit/Unit Tracking  6/13 6/15 6/22 6/27 6/29 7/11 7/18 7/20 7/25 7/27 8/1 8/3

## 2023-08-08 ENCOUNTER — OFFICE VISIT (OUTPATIENT)
Age: 83
End: 2023-08-08
Payer: MEDICARE

## 2023-08-08 DIAGNOSIS — I63.9 CEREBROVASCULAR ACCIDENT (CVA), UNSPECIFIED MECHANISM (HCC): Primary | ICD-10-CM

## 2023-08-08 DIAGNOSIS — I69.359 CVA, OLD, HEMIPARESIS (HCC): ICD-10-CM

## 2023-08-08 DIAGNOSIS — R29.898 WEAKNESS OF BOTH LOWER EXTREMITIES: Primary | ICD-10-CM

## 2023-08-08 PROCEDURE — 97112 NEUROMUSCULAR REEDUCATION: CPT

## 2023-08-08 NOTE — PROGRESS NOTES
Daily Note     Today's date: 2023  Patient name: Bassem Varela  : 1940  MRN: [id number]  Referring provider: Tabitha Burton MD  Dx:   Encounter Diagnosis     ICD-10-CM    1. Weakness of both lower extremities  R29.898       2. CVA, old, hemiparesis ([address])  I69.359           Start Time: 1415  Stop Time: 1500  Total time in clinic (min): 45 minutes    Subjective: Pt reports that she is doing well, nothing new to report. Wasn't able to go on her flight recently to [facility] because she was unable to walk. Objective: See treatment diary below    Sit to standing @ long bar: 3-5 minute rest break between each attempt to stand to allow recovery for max effort for next rep pre research standards. VCs: to remain upright, manual blocking of right knee with PT knee to hold knee extension. Rep 1: Max A x 1 Duration of standing, 10 seconds, improvements with cuing for hip extension and knee extension. Rep 2: Max A x 1, duration of standing 5-8 seconds, 1 attempt to improve triple extension with cuing    Rep 3: Max  A x 1, duration of standing 8-10 seconds, VC unsuccessful to improve righting posture. Rep 4: MaxAx1, duration of standing 5-10 seconds, continued crouched posture      Seated LAQ    R LE: manual resistance with extension  and flexion 2 sets 10 reps, varying resistance to achieve full range    L LE: manual resistance with extension  2 sets 10 reps, 3 sec hold end range with 2lb ankle wt on left leg. Seated march:   R LE: active assistive to achieve full height and cuing for eccentric lowering to challenge hip flexor more, 2 sets x 10 reps    L LE: manual resistance throughout range with varying degree to allow full range     Seated resisted hip abduction  2 x 10   seated hip adduction with pillow 10 sec x 30  Seated manual iso hip extension 10x5" R      HEP:  Access Code: [email or web address]  URL: [email or web address]  Date: 06/15/2023  Prepared by: Neal Mccall  - Seated March  - 1 x daily - 7 x weekly - 2 sets - 10 reps - 3 hold  - Seated Long Arc Quad  - 1 x daily - 7 x weekly - 2 sets - 10 reps - 3 hold  - Seated Trunk Rotation - Arms Crossed  - 1 x daily - 7 x weekly - 2 sets - 10 reps - 3 hold  - Seated Upright Posture Correction  - 1 x daily - 7 x weekly - 2 sets - 5 reps - 60 hold  - active weight shifting in wheelchair 1 x 10    Assessment:  Tolerated session well this date with focused on LE strengthening. Patient demonstrates good carry over with seated exercises. Attempted standing with patient at long bar as usual, Max A x 1 person with 1 successful attempt with standing, with some adjustment with cuing showing some minor improvement. Next 3 repetitions had minimal participation from patient with decreased ability to perform knee and hip extension to facilitate standing activity. Directions to pull with arms and push with legs was unsuccessful for these attempts. Patient continues to require encouragement to participate as well as therapeutic rest breaks 2/2 quick and sig fatigue that occurs. Introduced hip extension isometrics in attempt to improve hip extensors motor control. Patient continues to fatigue quickly and requires frequent rest breaks. Patient ended session feeling fatigued. Continue to try and stand patient next visit. Plan: Continue per plan of care. POC expires Auth Status Total   Visits  Start date  Expiration date PT/OT + Visit Limit?  Co-Insurance   9/5/23 Not req BOMN 6/13/23 9/5/23 no Yes                                         Visit/Unit Tracking  6/13 6/15 6/22 6/27 6/29 7/11 7/18 7/20 7/25 7/27 8/1 8/3   8/8

## 2023-08-08 NOTE — PROGRESS NOTES
POC expires Auth Status Total   Visits  Start date  Expiration date PT/OT + Visit Limit? Co-Insurance   23 BOMN     BOMN                                            Visit/Unit Tracking  AUTH Status:  Date 6/13 6/22 6/27 6/29 7/6 7/11 7/13 7/18 7/20  7/25 7/27 8/3 8/8       Visits  Authed:  Used 1(eval)  1 1 1 1 1 1 1 1 (progress note)  1 1 1 1        Remaining  23 25 21 21 19 18 17 16 15 14 13 12 11         PLAN OF CARE START:23  PLAN OF CARE END: 2023  PROGRESS NOTE DUE: 23 (progress note)   FREQUENCY: 1-2x/week for 8-12 weeks   PRECAUTIONS: 1:1 at all times, cog/aphasia, maintainence program     DIAGNOSIS: Chronic CVA with R side weakness   VISITS: 12 of 24       Daily Note     Today's date: 2023  Patient name: Sayda Lemos  : 1940  MRN: [id number]  Referring provider: Joselito Blount MD  Dx:   Encounter Diagnosis   Name Primary? • Cerebrovascular accident (CVA), unspecified mechanism ([address]) Yes                  Subjective: Pt and caregiver reports no changes since last visit. Objective: See treatment below. NMRE:   *R UE PROM: seated in w/c into all planes and pivots to patient tolerance with 5-10 second hold at end range. Focused on decreased stiffness, increased ROM, and to promote motor recovery for increased functional use of R UE. Performed in prep for therapeutic activity. *reach,grasp,release of large squigs tabletop>bin using R UE gross grasp. Pt required max A in MAS proximally and min A plus visual cues distally for for all components of task, fair success noted. Task ended due to difficulty with weight shifting, gross grasp/release patterns. Significant apraxia noted. *active RUE shoulder flexion/extension, horizontal AB/ADDuction with mod A in MAS, x12 reps total. Therapist incorporated visual cues for increased success. Increased time and 1/2-3/4 active range of R UE    Assessment: Tolerated treatment fair.  Noted increased fatigue and increased difficulty with reach/grasp/release tasks. Benefits form verbal encouragement throughout session. Continues to demonstrate apraxic movement patterns during task performance. Trini Sorensen (2013) is a [facility] decision that clarified that Medicare will in fact cover skilled therapy services to maintain a patient’s current condition or prevent/slow further deterioration. Pt would benefit from continued OT services to address R UE AAROM, weight bearing, sitting balance, weight shifting, tone management, endurance/acitivty tolerance, basic direction following, sustained attention. Plan: Continued skilled OT per POC.

## 2023-08-10 ENCOUNTER — OFFICE VISIT (OUTPATIENT)
Age: 83
End: 2023-08-10
Payer: MEDICARE

## 2023-08-10 DIAGNOSIS — I69.359 CVA, OLD, HEMIPARESIS (HCC): ICD-10-CM

## 2023-08-10 DIAGNOSIS — I63.9 CEREBROVASCULAR ACCIDENT (CVA), UNSPECIFIED MECHANISM (HCC): Primary | ICD-10-CM

## 2023-08-10 DIAGNOSIS — R29.898 WEAKNESS OF BOTH LOWER EXTREMITIES: Primary | ICD-10-CM

## 2023-08-10 PROCEDURE — 97112 NEUROMUSCULAR REEDUCATION: CPT

## 2023-08-10 PROCEDURE — 97112 NEUROMUSCULAR REEDUCATION: CPT | Performed by: PHYSICAL THERAPIST

## 2023-08-10 NOTE — PROGRESS NOTES
Daily Note     Today's date: 8/10/2023  Patient name: Frandy Barker  : 1940  MRN: [id number]  Referring provider: Yeni Nguyen MD  Dx:   Encounter Diagnosis     ICD-10-CM    1. Weakness of both lower extremities  R29.898       2. CVA, old, hemiparesis ([address])  I69.359                      Subjective: Pt reports that she is doing well, nothing new to report. Wasn't able to go on her flight recently to [facility] because she was unable to walk. Objective: See treatment diary below    Sit to standing @ long bar: 3-5 minute rest break between each attempt to stand to allow recovery for max effort for next rep pre research standards. VCs: to remain upright, manual blocking of right knee with PT knee to hold knee extension. Rep 1: Max A x 1 Duration of standing, 35 seconds, improvements with cuing for hip extension and knee extension. Rep 2: Max A x 1, duration of standing 45 seconds, improvements with cuing for hip extension and knee extension   Rep 3: Max  A x 1, duration of standing 3-7 seconds, VC unsuccessful to improve righting posture. Rep 4: Max Ax1, duration of standing 15-18 seconds, improvements with cuing for hip extension and knee extension   Rep 5: Max Ax1, duration of standing 3-5 seconds, VC unsuccessful to improve righting posture.         Seated LAQ    R LE: manual resistance with extension  and flexion 2 sets 10 reps, varying resistance to achieve full range    L LE: manual resistance with extension  2 sets 10 reps, varying resistance to achieve full range     Seated march:   R LE: active assistive to achieve full height and cuing for eccentric lowering to challenge hip flexor more, 2 sets x 10 reps    L LE: manual resistance throughout range with varying degree to allow full range     Seated resisted hip abduction  Followed by adduction; varying resistance to achieve full range   Seated manual iso hip extension 10x5" R      HEP:  Access Code: [email or web address]  URL: [email or web address]  Date: 06/15/2023  Prepared by: Neal Villalba    Exercises  - Seated March  - 1 x daily - 7 x weekly - 2 sets - 10 reps - 3 hold  - Seated Long Arc Quad  - 1 x daily - 7 x weekly - 2 sets - 10 reps - 3 hold  - Seated Trunk Rotation - Arms Crossed  - 1 x daily - 7 x weekly - 2 sets - 10 reps - 3 hold  - Seated Upright Posture Correction  - 1 x daily - 7 x weekly - 2 sets - 5 reps - 60 hold  - active weight shifting in wheelchair 1 x 10    Assessment:  Tolerated session well this date with focused on LE strengthening. Patient required cuing for manual resistance with hip flexion in seated 50% of reps BL. Attempted standing with patient at long bar as usual, Max A x 1 person with 2-3 successful attempt with standing, with some adjustment with cuing showing some minor improvement. 2 repetitions had minimal participation from patient. . Directions to pull with arms and push with legs was unsuccessful for these attempts. Patient continues to require encouragement to participate as well as therapeutic rest breaks due to  sig fatigue that occurs. Patient ended session feeling fatigued. Continue to try and stand patient next visit. Plan: Continue per plan of care. POC expires Auth Status Total   Visits  Start date  Expiration date PT/OT + Visit Limit?  Co-Insurance   9/5/23 Not req BOMN 6/13/23 9/5/23 no Yes                                         Visit/Unit Tracking  6/13 6/15 6/22 6/27 6/29 7/11 7/18 7/20 -[place] 7/25 7/27 8/1 8/3   8/8 8/10

## 2023-08-10 NOTE — PROGRESS NOTES
POC expires Auth Status Total   Visits  Start date  Expiration date PT/OT + Visit Limit? Co-Insurance   23 BOMN     BOMN                                            Visit/Unit Tracking  AUTH Status:  Date 6/13 6/22 6/27 6/29 7/6 7/11 7/13 7/18 7/20  7/25 7/27 8/3 8/8 8/10      Visits  Authed:  Used 1(eval)  1 1 1 1 1 1 1 1 (progress note)  1 1 1 1 1       Remaining  23 25 21 21 19 18 17 16 15 14 13 12 11 10        PLAN OF CARE START:23  PLAN OF CARE END: 2023  PROGRESS NOTE DUE: 23 (progress note)   FREQUENCY: 1-2x/week for 8-12 weeks   PRECAUTIONS: 1:1 at all times, cog/aphasia, maintainence program     DIAGNOSIS: Chronic CVA with R side weakness   VISITS: 13 of 24       Daily Note     Today's date: 8/10/2023  Patient name: Guanakito Dunham  : 1940  MRN: [id number]  Referring provider: Ronal Alvarado MD  Dx:   Encounter Diagnosis   Name Primary? • Cerebrovascular accident (CVA), unspecified mechanism ([address]) Yes                  Subjective: Pt and caregiver reports no changes since last visit. Objective: See treatment below. NMRE:   *R UE PROM: seated in w/c into all planes and pivots to patient tolerance with 5-10 second hold at end range. Focused on decreased stiffness, increased ROM, and to promote motor recovery for increased functional use of R UE.     *Reach,grasp,release of small rings in horizontal ab/adduction with shoulder arc using R UE gross grasp and release. Pt required max A proximally with fair success noted. Difficulty with weight shifting, gross grasp/release patterns although can demo with visual cues. Significant apraxia noted. Assessment: Tolerated treatment fair. Noted increased fatigue and increased difficulty with reach/grasp/release tasks. Benefits form verbal encouragement throughout session. Continues to demonstrate apraxic movement patterns during task performance.   Yimi Ritchie (2013) is a [facility] decision that clarified that Medicare will in fact cover skilled therapy services to maintain a patient’s current condition or prevent/slow further deterioration. Pt would benefit from continued OT services to address R UE AAROM, weight bearing, sitting balance, weight shifting, tone management, endurance/acitivty tolerance, basic direction following, sustained attention. Plan: Continued skilled OT per POC.

## 2023-08-15 ENCOUNTER — OFFICE VISIT (OUTPATIENT)
Age: 83
End: 2023-08-15
Payer: MEDICARE

## 2023-08-15 DIAGNOSIS — I69.359 CVA, OLD, HEMIPARESIS (HCC): ICD-10-CM

## 2023-08-15 DIAGNOSIS — R29.898 WEAKNESS OF BOTH LOWER EXTREMITIES: Primary | ICD-10-CM

## 2023-08-15 DIAGNOSIS — I63.9 CEREBROVASCULAR ACCIDENT (CVA), UNSPECIFIED MECHANISM (HCC): Primary | ICD-10-CM

## 2023-08-15 PROCEDURE — 97112 NEUROMUSCULAR REEDUCATION: CPT | Performed by: PHYSICAL THERAPIST

## 2023-08-15 PROCEDURE — 97530 THERAPEUTIC ACTIVITIES: CPT

## 2023-08-15 PROCEDURE — 97112 NEUROMUSCULAR REEDUCATION: CPT

## 2023-08-15 NOTE — PROGRESS NOTES
Daily Note     Today's date: 8/15/2023  Patient name: Michelle Koo  : 1940  MRN: [id number]  Referring provider: Amparo Garcia MD  Dx:   Encounter Diagnosis     ICD-10-CM    1. Weakness of both lower extremities  R29.898       2. CVA, old, hemiparesis ([address])  I69.359                      Subjective: Pt reports that she is doing well, nothing new to report. Objective: See treatment diary below    Sit to standing @ long bar: 3-5 minute rest break between each attempt to stand to allow recovery for max effort for next rep pre research standards. VCs: to remain upright, manual blocking of right knee with PT knee to hold knee extension. Rep 1: Max A x 1 Duration of standing, 20 seconds, improvements with cuing for hip extension and knee extension. Rep 2: Max A x 1, duration of standing 41 seconds, improvements with cuing for hip extension and knee extension. Rep 3: Max  A x 1, duration of standing 37 seconds, improvements with cuing for hip extension and knee extension. Rep 4: Max  A x 1, duration of standing 37 seconds, improvements with cuing for hip extension and knee extension. Rep 5: Max Ax1, duration of standing 15-18 seconds, improvements with cuing for hip extension and knee extension   Rep 6: Max Ax1, duration of standing 4-5 seconds, VC unsuccessful to improve righting posture. Rep 7: Max Ax1, duration of standing 3-5 seconds, VC unsuccessful to improve righting posture. Seated LAQ    R LE: manual resistance with extension  and flexion 1 sets 10 reps, v   L LE: manual resistance with extension  1 sets 10 reps,     Seated march:   R LE: active assistive to achieve full height and cuing for eccentric lowering to challenge hip flexor more, 2 sets x 10 reps    L LE: manual resistance throughout range with varying degree to allow full range      HEP:  Access Code: [email or web address]  URL: [email or web address]  Date: 06/15/2023  Prepared by: Sol Mccall  - Seated March  - 1 x daily - 7 x weekly - 2 sets - 10 reps - 3 hold  - Seated Long Arc Quad  - 1 x daily - 7 x weekly - 2 sets - 10 reps - 3 hold  - Seated Trunk Rotation - Arms Crossed  - 1 x daily - 7 x weekly - 2 sets - 10 reps - 3 hold  - Seated Upright Posture Correction  - 1 x daily - 7 x weekly - 2 sets - 5 reps - 60 hold  - active weight shifting in wheelchair 1 x 10    Assessment:   Tolerated session better this date with increase in active assistance with attempt to stands. Was able to stand with Max A x 1 person > 30 seconds 3 times, >15 seconds for 2 stands. Decrease participation with reps 6 and 7. Continued to require single step commands with pull arms and seqeeze butt to achieve as best upright posture. PT positioning of feet, blocking of right knee to prevent inversion of foot. Reviewed HEP with niece who was present with session with reprinting of HEP. Indicated that patient is able to perform these exercises in bed with support with pillow behind knee for SAQ and marches. Sitting tolerance can be conducted with letty lift and just releasing slight tension when about to lift out of bed. Pt niece expressed they will reinforce the HEP. Patient continues to require encouragement to participate as well as therapeutic rest breaks due to  sig fatigue that occurs. Patient ended session feeling fatigued. Continue to try and stand patient next visit. Plan: Continue per plan of care. POC expires Auth Status Total   Visits  Start date  Expiration date PT/OT + Visit Limit?  Co-Insurance   9/5/23 Not req BOMN 6/13/23 9/5/23 no Yes                                         Visit/Unit Tracking  6/13 6/15 6/22 6/27 6/29 7/11 7/18 7/20 -[place] 7/25 7/27 8/1 8/3   8/8 8/10 8/15

## 2023-08-15 NOTE — PROGRESS NOTES
POC expires Auth Status Total   Visits  Start date  Expiration date PT/OT + Visit Limit? Co-Insurance   23 BOMN     BOMN                                            Visit/Unit Tracking  AUTH Status:  Date 6/13 6/22 6/27 6/29 7/6 7/11 7/13 7/18 7/20  7/25 7/27 8/3 8 8/10 8/15     Visits  Authed:  Used 1(eval)  1 1 1 1 1 1 1 1 (progress note)  1 1 1 1 1 1      Remaining  23 22 21 20 19 18 17 16 15 14 13 12 11 10 9       PLAN OF CARE START:23  PLAN OF CARE END: 2023  PROGRESS NOTE DUE: follow up next visit   FREQUENCY: 1-2x/week for 8-12 weeks   PRECAUTIONS: 1:1 at all times, cog/aphasia, maintainence program     DIAGNOSIS: Chronic CVA with R side weakness   VISITS: 14 of 24       Daily Note     Today's date: 8/15/2023  Patient name: Suzy Chatman  : 1940  MRN: [id number]  Referring provider: David Pink MD  Dx:   Encounter Diagnosis   Name Primary? • Cerebrovascular accident (CVA), unspecified mechanism ([address]) Yes                  Subjective: Pt and caregiver reports no changes since last visit. Objective: See treatment below. NMRE:   *R UE PROM: seated in w/c into all planes and pivots to patient tolerance with 5-10 second hold at end range. Focused on decreased stiffness, increased ROM, and to promote motor recovery for increased functional use of R UE. *reach,grasp,release of squigs tabletop on R side>towel on L side using R UE gross grasp. Assist from therapist for proximal mobility/stability. Pt required hand over hand assist for gross grasp/release and VC. TA:   R UE stretching/theraputty HEP:   Therapist re-educated and reviewed and provided visual demonstration of HEP using yellow putty. Caregiver observered exercises and verbalized good understanding. Provided pt with written description and image of exercises. Instructed to discontinue with any discomfort or irritation. Pt verbalized understanding.  Reviewed the following exercises:   - Seated Shoulder Flexion Towel Slide at Table Top  - 1 x daily - 7 x weekly - 2 sets - 10 reps  - Seated Shoulder Flexion AAROM with Caregiver  - 1 x daily - 7 x weekly - 2 sets - 10 reps  - Seated Shoulder Abduction AAROM with Caregiver  - 1 x daily - 7 x weekly - 2 sets - 10 reps  - Supported Elbow Flexion Extension PROM  - 1 x daily - 7 x weekly - 2 sets - 10 reps  - Seated Wrist Extension PROM with caregiver  - 1 x daily - 7 x weekly - 2 sets - 10 reps  - Seated PROM Wrist Flexion PROM with Caregiver  - 1 x daily - 7 x weekly - 2 sets - 10 reps  - Putty Squeezes  - 1 x daily - 7 x weekly - 2 sets - 10 reps  - Seated Single Digit Intrinsic Stretch  - 1 x daily - 7 x weekly - 2 sets - 10 reps    Assessment: Tolerated treatment well. Benefits form verbal encouragement throughout session. Continues to demonstrate apraxic movement patterns during task performance. Trini Sorensen (2013) is a [facility] decision that clarified that Medicare will in fact cover skilled therapy services to maintain a patient’s current condition or prevent/slow further deterioration. Pt would benefit from continued OT services to address R UE AAROM, weight bearing, sitting balance, weight shifting, tone management, endurance/acitivty tolerance, basic direction following, sustained attention. Plan: Continued skilled OT per POC.

## 2023-08-17 ENCOUNTER — OFFICE VISIT (OUTPATIENT)
Age: 83
End: 2023-08-17
Payer: MEDICARE

## 2023-08-17 DIAGNOSIS — R29.898 WEAKNESS OF BOTH LOWER EXTREMITIES: Primary | ICD-10-CM

## 2023-08-17 DIAGNOSIS — I69.359 CVA, OLD, HEMIPARESIS (HCC): ICD-10-CM

## 2023-08-17 DIAGNOSIS — I63.9 CEREBROVASCULAR ACCIDENT (CVA), UNSPECIFIED MECHANISM (HCC): Primary | ICD-10-CM

## 2023-08-17 PROCEDURE — 97112 NEUROMUSCULAR REEDUCATION: CPT

## 2023-08-17 PROCEDURE — 97112 NEUROMUSCULAR REEDUCATION: CPT | Performed by: PHYSICAL THERAPIST

## 2023-08-17 NOTE — PROGRESS NOTES
POC expires Auth Status Total   Visits  Start date  Expiration date PT/OT + Visit Limit? Co-Insurance   23 BOMN     BOMN                                            Visit/Unit Tracking  AUTH Status:  Date 6/13 6/22 6/27 6/29 7/6 7/11 7/13 7/18 7/20  7/25 7/27 8/3 8 8/10 8/15 8/17    Visits  Authed:  Used 1(eval)  1 1 1 1 1 1 1 1 (progress note)  1 1 1 1 1 1 1     Remaining  23 25 21 21 19 18 17 16 15 14 13 12 11 10 9 8      PLAN OF CARE START:23  PLAN OF CARE END: 2023  PROGRESS NOTE DUE: 23 (progress note on schedule)   FREQUENCY: 1-2x/week for 8-12 weeks   PRECAUTIONS: 1:1 at all times, cog/aphasia, maintainence program     DIAGNOSIS: Chronic CVA with R side weakness   VISITS: 15 of 24       Daily Note     Today's date: 2023  Patient name: Barbie Lee  : 1940  MRN: [id number]  Referring provider: Janak Diana MD  Dx:   Encounter Diagnosis   Name Primary? • Cerebrovascular accident (CVA), unspecified mechanism ([address]) Yes                  Subjective: Pt and caregiver reports no changes since last visit. Objective: See treatment below. NMRE:   *R UE PROM: seated in w/c into all planes and pivots to patient tolerance with 5-10 second hold at end range. Focused on decreased stiffness, increased ROM, and to promote motor recovery for increased functional use of R UE. *anterior weight shifting using therapist shoulders for facilitation with pt using L UE, x3 sets of 10 reps; max VC for technique and pacing. Pt required up to mod A for force production. Performed in efforts to increase independence with mobility and transfers. Emphasis on motor control and core stability    *squeezing towel using B hands focused on gross grasp/release. Noted apraxia, x20 reps total with hand over hand assist.     Assessment: Tolerated treatment well. Daughter educated in process of CVA recovery this session. Benefits form verbal encouragement throughout session.  Continues to demonstrate apraxic movement patterns during task performance. Trini Sorensen (2013) is a [facility] decision that clarified that Medicare will in fact cover skilled therapy services to maintain a patient’s current condition or prevent/slow further deterioration. Pt would benefit from continued OT services to address R UE AAROM, weight bearing, sitting balance, weight shifting, tone management, endurance/acitivty tolerance, basic direction following, sustained attention. Plan: Continued skilled OT per POC.

## 2023-08-17 NOTE — PROGRESS NOTES
Daily Note     Today's date: 2023  Patient name: Samantha Ravi  : 1940  MRN: [id number]  Referring provider: Teri Connelly MD  Dx:   Encounter Diagnosis     ICD-10-CM    1. Weakness of both lower extremities  R29.898       2. CVA, old, hemiparesis ([address])  I69.359                      Subjective: Pt reports that she is doing well, nothing new to report. Present in w/c with right side not locking. Objective: See treatment diary below    Sit to standing @ long bar: 3-5 minute rest break between each attempt to stand to allow recovery for max effort for next rep pre research standards. VCs: to remain upright, manual blocking of right knee with PT knee to hold knee extension. Rep 1: Max A x 1 duration of standing, 37 seconds, improvements with cuing for hip extension and knee extension. Rep 2: Max A x 1, duration of standing 19 seconds, improvements with cuing for hip extension and knee extension. Rep 3: Max A x 1, duration of standing 22 seconds, improvements with cuing for hip extension and knee extension. Rep 4: Max A x 1, duration of standing 21 seconds, improvements with cuing for hip extension and knee extension. Rep 5: Max Ax1, duration of standing 8 seconds, VC unsuccessful to improve righting posture. Rep 6: Max Ax1, duration of standing 6 seconds, VC unsuccessful to improve righting posture. Seated LAQ    R LE: manual resistance with extension  and flexion 1 sets 10 reps, v   L LE: manual resistance with extension  1 sets 10 reps,     Seated march:   R LE: active assistive to achieve full height and cuing for eccentric lowering to challenge hip flexor more, 1 sets x 10 reps    L LE: manual resistance throughout range with varying degree to allow full range      HEP:  Access Code: [email or web address]  URL: [email or web address]  Date: 06/15/2023  Prepared by: Delmy Good    Exercises  - Seated March  - 1 x daily - 7 x weekly - 2 sets - 10 reps - 3 hold  - Seated Long Arc Quad  - 1 x daily - 7 x weekly - 2 sets - 10 reps - 3 hold  - Seated Trunk Rotation - Arms Crossed  - 1 x daily - 7 x weekly - 2 sets - 10 reps - 3 hold  - Seated Upright Posture Correction  - 1 x daily - 7 x weekly - 2 sets - 5 reps - 60 hold  - active weight shifting in wheelchair 1 x 10    Assessment:   Tolerated session better this date with increase in active assistance with attempt to stands. Was able to stand with Max A x 1 person > 30 seconds 1 times, >15 seconds for 3 stands. Decrease participation with reps 6 and 7. Continued to require single step commands with pull arms and seqeeze butt to achieve as best upright posture. PT positioning of feet, blocking of right knee to prevent inversion of foot. Education to continue with HEP , caregiver reported unable to conduct it regularly. Patient continues to require encouragement to participate as well as therapeutic rest breaks due to  sig fatigue that occurs. Patient ended session feeling fatigued. Education on fixing brakes on W/C to improve safety with sit to stands on right side. Continue to try and stand patient next visit. Plan: Continue per plan of care. POC expires Auth Status Total   Visits  Start date  Expiration date PT/OT + Visit Limit?  Co-Insurance   9/5/23 Not req BOMN 6/13/23 9/5/23 no Yes                                         Visit/Unit Tracking  6/13 6/15 6/22 6/27 6/29 7/11 7/18 7/20 -CA 7/25 7/27 8/1 8/3   8/8 8/10 8/15 8/17

## 2023-08-22 ENCOUNTER — EVALUATION (OUTPATIENT)
Age: 83
End: 2023-08-22
Payer: MEDICARE

## 2023-08-22 ENCOUNTER — OFFICE VISIT (OUTPATIENT)
Age: 83
End: 2023-08-22
Payer: MEDICARE

## 2023-08-22 DIAGNOSIS — R29.898 WEAKNESS OF BOTH LOWER EXTREMITIES: Primary | ICD-10-CM

## 2023-08-22 DIAGNOSIS — I63.9 CEREBROVASCULAR ACCIDENT (CVA), UNSPECIFIED MECHANISM (HCC): Primary | ICD-10-CM

## 2023-08-22 DIAGNOSIS — I69.359 CVA, OLD, HEMIPARESIS (HCC): ICD-10-CM

## 2023-08-22 PROCEDURE — 97112 NEUROMUSCULAR REEDUCATION: CPT | Performed by: PHYSICAL THERAPIST

## 2023-08-22 PROCEDURE — 97164 PT RE-EVAL EST PLAN CARE: CPT | Performed by: PHYSICAL THERAPIST

## 2023-08-22 PROCEDURE — 97112 NEUROMUSCULAR REEDUCATION: CPT

## 2023-08-22 PROCEDURE — 97168 OT RE-EVAL EST PLAN CARE: CPT

## 2023-08-22 NOTE — PROGRESS NOTES
Patient standing ability has demonstrated mini gains since 7/11/2023 with noted decline in ability to stand longer with 1 person assistance past 30 seconds since 7/20. 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 Patient has stood past 30 seconds or longer on the following dates indicated below. :  7/11/23: 1/6 rep x 2 person assistance,   7/13/23: 2/4 reps x 2 person assistance,   7/18/23: 5/6 reps with 4 reps 1 person assistance and 2 rep 2 person assistance  7/20/23: 4/4 reps with 1 person assistance with 1 rep at 60 seconds   7/25/23: 3/4 reps with 1 person assistance   7/27/23: 3/4 reps with 1 person assistance   8/3/23: 0/4 reps   8/8/23: 0/4 reps  8/10/23: 2/5 reps with 1 person assistance   8/15/23: 3/7 reps with 1 person assistance   8/17/23: 1/6 rep with 1 person assistance       7/11/2023 7/13/2023 7/18/2023   Rep 1: Mod A x 2 with progression to min A X 1 once upright. Duration of standing 15 seconds   Rep 2: Mod A x 2 with progression to min A X 1 once upright. Duration of standing 20 seconds  Rep 3: Mod A x 2 with progression to min A X 1 once upright. Duration of standing 30 seconds  Rep 4: Mod A x 2 with progression to min A X 1 once upright. Duration of standing 15 seconds  Rep 5: Mod A x 2 with progression to min A X 1 once upright. Duration of standing 20 seconds  Refused to stand any further, re-attempted post Seated marches and LAQ. With ability to one conducted 1 further stand,  Rep 6: Max A x 1 with progression to min A X 1 once upright. Duration of standing 5 seconds Rep 1: Mod A x 2 Duration of standing 45 seconds  Rep 2: Mod A x 2  25 seconds  Rep 3: Mod A x 2  Duration of standing 30 seconds  Rep 4: Max A x 2  Duration of standing 15 seconds  Rep 5: Max A x 2. Duration of standing 10 seconds Rep 1: Max to Mod  A x 1 Duration of standing 45 seconds  Rep 2:  Max to Mod  A x 1 Duration of standing 37seconds  Rep 3: Max to Mod  A x 1 Duration of standing 41 seconds  Rep 4: Max to Mod  A x 1 Duration of standing 30 seconds  Rep 5: Max A x 2. Duration of standing 11 seconds  Rep 6: Max A x 2. Duration of standing 44 seconds   7/20/23 7/25/2023 7/27/2023   Rep 1: Max to Mod  A x 1 Duration of standing 57 seconds  Rep 2: Max to Mod  A x 1 Duration of standing 45 seconds  Rep 3: Max to Mod  A x 1 Duration of standing 60 seconds  Rep 4: Max to Mod  A x 1 Duration of standing 37 seconds Rep 1: Max to Mod  A x 1 Duration of standing 30 seconds  Rep 2: Max to Mod  A x 1 Duration of standing 44 seconds  Rep 3: Max  A x 1 Duration of standing 32 seconds  Rep 4: Max  A x 1 Duration of standing 23 seconds; decline to stand further. Rep 1: Max to Mod  A x 1 Duration of standing 30 seconds  Rep 2: Max to Mod  A x 1 Duration of standing 44 seconds  Rep 3: Max  A x 1 Duration of standing 32 seconds  Rep 4: Max  A x 1 Duration of standing 23 seconds;     8/3/2023 8/8/2023 8/10/2023   Rep 1: Max A x 1 Duration of standing 5 seconds, refused to attempt again   Rep 2: Max A x 1, 3 attempts to stand without success   Rep 3: Max  A x 1, 2 attempts to stand with patient refusing to attempt further   Rep 4: Pt refused to attempted 4th attempted. Switched to seated exercises. Rep 1: Max A x 1 Duration of standing, 10 seconds, improvements with cuing for hip extension and knee extension. Rep 2: Max A x 1, duration of standing 5-8 seconds, 1 attempt to improve triple extension with cuing   Rep 3: Max  A x 1, duration of standing 8-10 seconds, VC unsuccessful to improve righting posture. Rep 4: MaxAx1, duration of standing 5-10 seconds, continued crouched posture       Rep 1: Max A x 1 Duration of standing, 35 seconds, improvements with cuing for hip extension and knee extension. Rep 2: Max A x 1, duration of standing 45 seconds, improvements with cuing for hip extension and knee extension  Rep 3:  Max  A x 1, duration of standing 3-7 seconds, VC unsuccessful to improve righting posture. Rep 4: Max Ax1, duration of standing 15-18 seconds, improvements with cuing for hip extension and knee extension  Rep 5: Max Ax1, duration of standing 3-5 seconds, VC unsuccessful to improve righting posture. 8/15/2023 8/17/2023    Rep 1: Max A x 1 Duration of standing, 20 seconds, improvements with cuing for hip extension and knee extension. Rep 2: Max A x 1, duration of standing 41 seconds, improvements with cuing for hip extension and knee extension. Rep 3: Max  A x 1, duration of standing 37 seconds, improvements with cuing for hip extension and knee extension. Rep 4: Max  A x 1, duration of standing 37 seconds, improvements with cuing for hip extension and knee extension. Rep 5: Max Ax1, duration of standing 15-18 seconds, improvements with cuing for hip extension and knee extension  Rep 6: Max Ax1, duration of standing 4-5 seconds, VC unsuccessful to improve righting posture. Rep 7: Max Ax1, duration of standing 3-5 seconds, VC unsuccessful to improve righting posture. Rep 1: Max A x 1 duration of standing, 37 seconds, improvements with cuing for hip extension and knee extension. Rep 2: Max A x 1, duration of standing 19 seconds, improvements with cuing for hip extension and knee extension. Rep 3: Max A x 1, duration of standing 22 seconds, improvements with cuing for hip extension and knee extension. Rep 4: Max A x 1, duration of standing 21 seconds, improvements with cuing for hip extension and knee extension. Rep 5: Max Ax1, duration of standing 8 seconds, VC unsuccessful to improve righting posture. Rep 6: Max Ax1, duration of standing 6 seconds, VC unsuccessful to improve righting posture.

## 2023-08-22 NOTE — PROGRESS NOTES
POC expires Auth Status Total   Visits  Start date  Expiration date PT/OT + Visit Limit? Co-Insurance   23 BOMN     BOMN                                            Visit/Unit Tracking  AUTH Status:  Date 6/13 6/22 6/27 6/29 7/6 7/11 7/13 7/18 7/20  7/25 7/27 8/3 8 8/10 8/15 8/17 8/22        Visits  Authed:  Used 1(eval)  1 1 1 1 1 1 1 1 (progress note)  1 1 1 1 1 1 1 1 (re-evaluation)         Remaining  23 25 21 21 19 25 17 16 15 14 13 12 11 10 9 8 7          PLAN OF CARE START:23  PLAN OF CARE END: 2023  PROGRESS NOTE DUE: follow up next visit; discharge pending next week pending family discussion on best therapy placement for patient   FREQUENCY: 1-2x/week for 8-12 weeks   PRECAUTIONS: 1:1 at all times, cog/aphasia, maintainence program     DIAGNOSIS: Chronic CVA with R side weakness   VISITS: 17 of 24     OCCUPATIONAL THERAPY RE- EVALUATION    Today's Date: 2023  Patient Name: Katie Chen  : 1940  MRN: [id number]  Referring Provider: Ruth Brooke MD  Dx: Cerebrovascular accident (CVA), unspecified mechanism ([address]) [I63.9]    SKILLED ANALYSIS:    Pt presents to re-evaluation on 23 status post s/p CVA (~3 years ago) with R side weakness (chronic). As per interview with family member, they report no major improvements since previous evaluation. Reports patient remains bed level for all ADLs and only transfers out of bed when attending therapy sessions outside of the home. Pt continues to require total A for all ADLS/self care. Post assessments, pt demonstrating no major improvements at this time, all assessment scores remained since previous evaluation. Pt continues to present with R UE impaired ROM, tone/spasticity, and cognitive impairments. Motor apraxia and cognitive impairments significantly affect progress and function in therapy sessions. Pt inconsistently able to perform tasks with R UE.   Discussed at length with caregiver/family member skilled OT options including benefits of home care vs outpatient facility that has bariatric and low functional level equipment to improve pt functional status/outcomes. Also discussed at length progression of current therapy services and plateau in therapy at this time. This therapist continues to educate on importance of OOB tolerance and education on CVA recovery including optimal time periods for increased function. At this time recommendation to consult with MD and family to determine future therapy needs as patient has made limited progress in current OP OT/PT services (primary PT to follow up with pt family to discuss). Pending follow up, potential discharge in upcoming sessions. All parties present in session agreeable. Trini vs. Franchesca (2013) is a [facility] decision that clarified that Medicare will in fact cover skilled therapy services to maintain a patient’s current condition or prevent/slow further deterioration. Subjective  Occupational Profile  Pt resides with daughter,  recently passed away. Pt resides in private home with ramped enterance. First floor set up. Transferring in/out of bed via mechanical lift. All ADLs total A bed level at this time. Total A with IADLs including community mobility. Pt does not own any braces. Pt currently not working, however during working years was a . Pt reports interest in clothing including "creation" of them. Pt has 24 hours private care. Pt remains in bed majority of the time, transfers out of bed for therapy only. Aide reports patient sits EOB occasionally. PATIENT GOAL: "to move from bed to chair without letty lift"     HISTORY OF PRESENT ILLNESS:   Pt is a 80 y.o. female who was referred to Occupational Therapy s/p  Cerebrovascular accident (CVA), unspecified mechanism ([address]) [I63.9] Hx of CVA ~2 years ago. Pt was receiving outpatient therapy services at [facility], transferred to this facility due to location.  Pt was receiving OT/PT/SLP 2x/week. Last visit reported by patient was last week. PMH: No past medical history on file.     Past Surgical Hx:   Past Surgical History:   Procedure Laterality Date   • FL GUIDED CENTRAL VENOUS ACCESS DEVICE REMOVAL  9/14/2021        Pain:  Location: legs and back occasionally     Objective    RUE LUE Comments           UPPER EXTREMITY FUNCTION   Impaired Impaired Dominant Hand: R UE     AROM (Seated)          R UE AROM tested only   Elevation <1/2 range  Near full  Remained    Shoulder FF <1/2 range   3/4 range   remained   Shoulder Ext <1/2 range   3/4 range   Remained    Shoulder Abd <1/2 range   3/4 range   Remained    Shoulder Add <1/2 range   1/2 range   Remained    Horizontal Abd <1/2 range   1/2 range   Remained    Horizontal Add <1/2 range   Near full   Remained    External rotation  <1/2 range  Near full  Remained    Elbow Flex 1/2 range  Near full   Remained    Elbow Ext 1/2 range  Near full   Remained   Pronation <1/2 range   Near full   Remained    Supination <1/2 range  Near full   Remained    Wrist Flex 3/4  range   Full   Remained    Wrist Ext 3/4 range  Full   Remained    Digit Flex 3/4 range   full  Remained    Digit Ex Near full   Full   Remained    Composite Grasp 3/4 range   Full   Remained      PROM (seated position)    Right side tested only        Shoulder FF 3/4 range  Remained    Shoulder Ext 3/4 range   Remained    Shoulder ABD 3/4 range   Remained    Shoulder ADD 3/4 range   Remained    Horizontal ABD Near full   Remained    Horizontal ADD Near full    Remained    Elbow Flex Near full   Spasticity noted;remained    Elbow Ext 3/4 range   Remained    Wrist Flex Full   Remained    Wrist Ext Full   Remained    Digit Flex full  Remained    Digit Ext Full   Remained    Supination 3/4 range   Remained    Pronation Near full   Remained      COORDINATION      Opposition Unable  Impaired  Remained    Finger to Nose Unable  Impaired   difficulty with command following; remained      TONE: MODIFIED EMMANUEL SCALE      No increase in muscle tone (0)      Slight Increase in muscle tone with catch and release or min resist at end range (1)      Slight Increase in muscle tone with catch and release, followed by min resistance through remainder of range (1+) Elbow flexors, shoulder flexors   Remained    Increased muscle tone through full range, able to be moved easily (2)      Considerable increase in tone, difficult to move (3)      Rigid in Flexion/Extension (4)               TEMPORAL ORIENTATION:   Year: accurate (remained)   Month: accurate (remained)   Day of the week: accurate (remained)  Date: accurate (remained)   Type of facility: accurate (remained)   Location of facility: accurate (remained)     SITTING BALANCE: up to max A (remained since previous testing)     *PROM: seated into all planes and pivots to patient tolerance with 5-10 second hold at end range. Focused on decreased stiffness, increased ROM, and to promote motor recovery for increased functional use  and motor control of R UE. Performed in prep for therapeutic activity.      SHORT TERM GOALS (4-6 weeks)    GOAL  STATUS ON IE  GOAL STATUS    Caregiver will demo good carryover of clinic and home tone reduction strategies for maintained AROM initiation with functional reach with ADL fxn 1+ ACHIEVED          Caregivers will demo G carryover of Home Exercise Program to improve functional progression towards goals in plan of care and for improved functional use of UE  ACHIEVED        Pt will tolerate supine and/or seated exercise x 8 minutes with minimal rest breaks required for maintained engagement in life roles and weekly exercise regimen   PROGRESSING, CONTINUE GOAL        Pt will demo ability to tolerate SS position EOM with mod-max A for sitting balance while performing therapeutic activity of choice in order to maintain core stability for no less then 10 minutes  Max A  ACHIEVED        LONG TERM GOALS( 8-12 weeks) GOAL  STATUS ON IE  GOAL STATUS    Pt will be oriented x4 100% of the time for 5/5 trials using external cues as needed  x3  PROGRESSING, CONTINUE GOAL        Pt will tolerate supine and/or seated exercise x 10 minutes with minimal rest breaks required for maintained engagement in life roles and weekly exercise regimen   PROGRESSING, CONTINUE GOAL        Pt will demo ability to follow 1-2 step commands with 100% accuracy for 3/5 trials for maintained engagement in salient tasks  PROGRESSING, CONTINUE GOAL        Caregivers will demo and/or verbalize understanding of use of recommended DME and/or AE for increased patient safety   PROGRESSING, CONTINUE GOAL        Caregivers will demo G comprehension of safe transfer and bed mobility techniques to maintain safety during ADLs and mobility   DISCONTINUED due to environmental  limitations            OTHER PLANNED THERAPY INTERVENTIONS:   Supine, seated, and in stance neuro re-ed  Tricep AG  NMES/FES  FMC/prehension  Timed Trials  Manual tx  Hand to target  Sensory re-ed  Seated functional reach: crossing midline  Supine place and hold  WBearing strategies   Closed chain activities  Open chain activities  Internal and external memory aides  Multimatrix for saccades/ visual clutter/attention  Hypersensitivity strategies education  Multi-modal environment  Sustained/alternating/divided attention  Tracking tube  Oculomotor control:  saccades, con/divergence  Conv./div.  Dynamic tasks  Work stations with timed transitions  Temporal Awareness  Memory and mental manipulation  Auditory processing with immediate recall  Memory retention with immediate and delayed recall  Edu on cog/vision apps

## 2023-08-22 NOTE — PROGRESS NOTES
PT Re-Evaluation          POC expires Auth Status Total   Visits  Start date  Expiration date PT/OT + Visit Limit? Co-Insurance   23 Not req BOMN 23 no Yes                                         Visit/Unit Tracking  6/13 6/15 6/22 6/27 6/29 7/11 7/18 7/20 -MA 7/25 7/27 8/1 8/3   8/8 8/10 8/15 8/17 8/22- Re-eval                                 Today's date: 2023  Patient name: Chani Mccann  : 1940  MRN: [id number]  Referring provider: Adrian Barney MD  Dx:   Encounter Diagnosis     ICD-10-CM    1. Weakness of both lower extremities  R29.898       2. CVA, old, hemiparesis [facility])  I69.359             Assessment  Assessment details: Patient is a 80 y.o. Female who presents to skilled outpatient PT with chronic CVA (early ). Upon re- evaluation, pt has R-sided hemiparesis/hemiplegia from CVA with continuing with dec LE strength, poor posture, dec sitting balance, dec sensation in RLE, dec activity tolerance and endurance. Patient standing ability has demonstrated mini gains since 2023 with noted decline in ability to stand longer with 1 person assistance past 30 seconds since . 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 Patient has stood past 30 seconds or longer on the following dates indicated below.    23: 1/6 rep x 2 person assistance,   23: 2/4 reps x 2 person assistance,   23: 5/6 reps with 4 reps 1 person assistance and 2 rep 2 person assistance  23: 4/4 reps with 1 person assistance with 1 rep at 60 seconds   23: 3/4 reps with 1 person assistance   23: 3/4 reps with 1 person assistance   8/3/23: 0/4 reps   23: 0/4 reps  8/10/23: 2/5 reps with 1 person assistance   8/15/23: 3/7 reps with 1 person assistance   23: 1/6 rep with 1 person assistance   23: 2/ rep with 1 person assistance     PT recommendation is to transfer patient to another facility that has the equipment that is able to better met her needs with standing assistance ( standing frame, body weight support system) which [facility] is able to fulfill. Pt is at high risk for regression and dependency without therapy services. Patient will be DC from this facility once able to transfer to new facility [facility]. " Spoke with patient daughter who is agreeable to PT recommendation. Impairments: Abnormal coordination, Abnormal gait, Abnormal muscle tone, Abnormal or restricted ROM, Activity intolerance, Impaired balance, Impaired physical strength, Lacks appropriate HEP, Poor posture, Poor body mechanics, Pain with function, Safety issue, Weight-bearing intolerance, Abnormal movement, Difficulty understanding, Abnormal muscle firing  Understanding of Dx/Px/POC: Good  Prognosis: Good    Patient verbalized understanding of POC. Please contact me if you have any questions or recommendations. Thank you for the referral and the opportunity to share in [address] [place] [address]. Plan  Plan details:  Will see pt 2x/week  Patient would benefit from: PT Eval, Skilled PT, OT Eval and Speech Eval  Planned modality interventions: Biofeedback, Cryotherapy, TENS, Thermotherapy  Planned therapy interventions: Abdominal trunk stabilization, ADL training, Balance, Balance/WB training, Breathing training, Body mechanics training, Coordination, Functional ROM exercises, Gait training, HEP, Joint Mobilization, Manual Therapy, Gloria taping, Motor coordination training, Neuromuscular re-education, Patient education, Postural training, Strengthening, Stretching, Therapeutic activities, Therapeutic exercises, Therapeutic training, Transfer training, Activity modification, Work reintegration  Frequency: 2x/wk  Duration in weeks: 12  Plan of Care beginning date: 6/13/23  Plan of Care expiration date: 12 weeks - 11/14/2023  Treatment plan discussed with: Patient and caretakers     Goals  Short Term Goals (4 weeks):    - Patient will be independent in basic HEP 2-3 weeks  - Patient will improve 5xSTS score by 2.3 seconds to promote improved LE functional strength needed for ADLs  - Patient will demo standing tolerance to 60s MET with Mod to Max A x 1 person at Long bar and UE support. - Pt will improve SIS to 230   - Pt will improve STS to mod Ax1 Progressing    Long Term Goals (12 weeks):  - Patient will be independent in a comprehensive home exercise program  - Patient will complete SCOTT to facilitate return to safe independent ambulation  - Patient will stand at [employer] for 2 minutes  - Patient will improve SIS to 250  - Pt will complete slide board transfer with Mod Ax1 to demo improved independence and dec caregiver burden    Subjective  History of Present Illness  Mechanism of injury: Pt reports 2022 CVA using letty lift and dependent at home. She is not doing any standing at home, completed leg strengthening exercises in chair. She has been receiving PT/OT at [facility] for the past year. Reports ability to stand utilizing standing frame, has not completed without. Goal is to get better with transfers and walk. She is accompanied by two caretakers. Spoke with daughter on phone, will get [facility] rehab notes faxed over for more information of current status.      Primary AD: none/letty dependent  Assist level at home: dependent   WC usage: primary   PLOF: independent before CVA    Pain  Current pain rating: none  Hand dominance: R handed    Objective   LE MMT  - R Hip Flexion: 3-/5  - L Hip Flexion: 3-/5  - R Knee Extension: 3-/5 - L Knee Extension: 3+/5  - R Ankle DF: 1/5  - L Ankle DF: 3-/5  - R Ankle PF: 0/5  - L Ankle PF: 1+/5    Sensation  - Light touch: WFL  - Pt reports dec sensation in RLE & RUE    Modified Kush  - R knee extension: 0 - no increase in tone  - L knee extension: 0 - no increase in tone  - R knee flexion: 0 - no increase in tone  - L knee flexion: 0 - no increase in tone  - R ankle DF: 0 - no increase in tone   - L ankle DF: 0 - no increase in tone         Outcome Measures Initial Eval  6/13 scores   SIS *caregiver completed physical 10    Memory & thinking 29    Mood and emotions 41    communication 30    ADLs 17    mobility 13    hand 9    Activities/meaning 41    How much recovered since CVA 30    TOTAL SCORE: 220/395      Sit to standing @ long bar: 3-5 minute rest break between each attempt to stand to allow recovery for max effort for next rep pre research standards. VCs: to remain upright, manual blocking of right knee with PT knee to hold knee extension. 7/11/2023 7/13/2023 7/18/2023   Rep 1: Mod A x 2 with progression to min A X 1 once upright. Duration of standing 15 seconds   Rep 2: Mod A x 2 with progression to min A X 1 once upright. Duration of standing 20 seconds  Rep 3: Mod A x 2 with progression to min A X 1 once upright. Duration of standing 30 seconds  Rep 4: Mod A x 2 with progression to min A X 1 once upright. Duration of standing 15 seconds  Rep 5: Mod A x 2 with progression to min A X 1 once upright. Duration of standing 20 seconds  Refused to stand any further, re-attempted post Seated marches and LAQ. With ability to one conducted 1 further stand,  Rep 6: Max A x 1 with progression to min A X 1 once upright. Duration of standing 5 seconds Rep 1: Mod A x 2 Duration of standing 45 seconds  Rep 2: Mod A x 2  25 seconds  Rep 3: Mod A x 2  Duration of standing 30 seconds  Rep 4: Max A x 2  Duration of standing 15 seconds  Rep 5: Max A x 2. Duration of standing 10 seconds Rep 1: Max to Mod  A x 1 Duration of standing 45 seconds  Rep 2: Max to Mod  A x 1 Duration of standing 37seconds  Rep 3: Max to Mod  A x 1 Duration of standing 41 seconds  Rep 4: Max to Mod  A x 1 Duration of standing 30 seconds  Rep 5: Max A x 2. Duration of standing 11 seconds  Rep 6: Max A x 2.  Duration of standing 44 seconds   7/20/23 7/25/2023 7/27/2023   Rep 1: Max to Mod  A x 1 Duration of standing 57 seconds  Rep 2: Max to Mod  A x 1 Duration of standing 45 seconds  Rep 3: Max to Mod  A x 1 Duration of standing 60 seconds  Rep 4: Max to Mod  A x 1 Duration of standing 37 seconds Rep 1: Max to Mod  A x 1 Duration of standing 30 seconds  Rep 2: Max to Mod  A x 1 Duration of standing 44 seconds  Rep 3: Max  A x 1 Duration of standing 32 seconds  Rep 4: Max  A x 1 Duration of standing 23 seconds; decline to stand further. Rep 1: Max to Mod  A x 1 Duration of standing 30 seconds  Rep 2: Max to Mod  A x 1 Duration of standing 44 seconds  Rep 3: Max  A x 1 Duration of standing 32 seconds  Rep 4: Max  A x 1 Duration of standing 23 seconds;     8/3/2023 8/8/2023 8/10/2023   Rep 1: Max A x 1 Duration of standing 5 seconds, refused to attempt again   Rep 2: Max A x 1, 3 attempts to stand without success   Rep 3: Max  A x 1, 2 attempts to stand with patient refusing to attempt further   Rep 4: Pt refused to attempted 4th attempted. Switched to seated exercises. Rep 1: Max A x 1 Duration of standing, 10 seconds, improvements with cuing for hip extension and knee extension. Rep 2: Max A x 1, duration of standing 5-8 seconds, 1 attempt to improve triple extension with cuing   Rep 3: Max  A x 1, duration of standing 8-10 seconds, VC unsuccessful to improve righting posture. Rep 4: MaxAx1, duration of standing 5-10 seconds, continued crouched posture       Rep 1: Max A x 1 Duration of standing, 35 seconds, improvements with cuing for hip extension and knee extension. Rep 2: Max A x 1, duration of standing 45 seconds, improvements with cuing for hip extension and knee extension  Rep 3: Max  A x 1, duration of standing 3-7 seconds, VC unsuccessful to improve righting posture. Rep 4: Max Ax1, duration of standing 15-18 seconds, improvements with cuing for hip extension and knee extension  Rep 5:  Max Ax1, duration of standing 3-5 seconds, VC unsuccessful to improve righting posture. 8/15/2023 8/17/2023 8/22/2023   Rep 1: Max A x 1 Duration of standing, 20 seconds, improvements with cuing for hip extension and knee extension. Rep 2: Max A x 1, duration of standing 41 seconds, improvements with cuing for hip extension and knee extension. Rep 3: Max  A x 1, duration of standing 37 seconds, improvements with cuing for hip extension and knee extension. Rep 4: Max  A x 1, duration of standing 37 seconds, improvements with cuing for hip extension and knee extension. Rep 5: Max Ax1, duration of standing 15-18 seconds, improvements with cuing for hip extension and knee extension  Rep 6: Max Ax1, duration of standing 4-5 seconds, VC unsuccessful to improve righting posture. Rep 7: Max Ax1, duration of standing 3-5 seconds, VC unsuccessful to improve righting posture. Rep 1: Max A x 1 duration of standing, 37 seconds, improvements with cuing for hip extension and knee extension. Rep 2: Max A x 1, duration of standing 19 seconds, improvements with cuing for hip extension and knee extension. Rep 3: Max A x 1, duration of standing 22 seconds, improvements with cuing for hip extension and knee extension. Rep 4: Max A x 1, duration of standing 21 seconds, improvements with cuing for hip extension and knee extension. Rep 5: Max Ax1, duration of standing 8 seconds, VC unsuccessful to improve righting posture. Rep 6: Max Ax1, duration of standing 6 seconds, VC unsuccessful to improve righting posture. Rep 1: Max A x 1 duration of standing, 36 seconds, improvements with cuing for hip extension and knee extension. Rep 2: Max A x 1, duration of standing 16 seconds, improvements with cuing for hip extension and knee extension. Rep 3: Max A x 1, duration of standing 30 seconds, improvements with cuing for hip extension and knee extension. Rep 4:  Max A x 1, duration of standing 19 seconds, improvements with cuing for hip extension and knee extension. Rep 5: Max Ax1, duration of standing 18 seconds, VC unsuccessful to improve righting posture. Precautions: CVA, awaiting fax of [facility] notes of PMHx  No past medical history on file. Daily interventions     Rep 1: Max to Mod  A x 1 Duration of standing 57 seconds    Rep 2:  Max to Mod  A x 1 Duration of standing 45 seconds   Rep 3: Max to Mod  A x 1 Duration of standing 60 seconds   Rep 4: Max to Mod  A x 1 Duration of standing 37 seconds

## 2023-08-24 ENCOUNTER — OFFICE VISIT (OUTPATIENT)
Dept: BARIATRICS | Facility: CLINIC | Age: 83
End: 2023-08-24

## 2023-08-24 VITALS
TEMPERATURE: 98.3 F | WEIGHT: 293 LBS | RESPIRATION RATE: 20 BRPM | BODY MASS INDEX: 55.32 KG/M2 | OXYGEN SATURATION: 95 % | HEIGHT: 61 IN | HEART RATE: 87 BPM

## 2023-08-24 DIAGNOSIS — E03.9 HYPOTHYROIDISM: ICD-10-CM

## 2023-08-24 DIAGNOSIS — I50.32 CHRONIC DIASTOLIC CHF (CONGESTIVE HEART FAILURE) (HCC): ICD-10-CM

## 2023-08-24 DIAGNOSIS — I69.30 HISTORY OF HEMORRHAGIC CEREBROVASCULAR ACCIDENT (CVA) WITH RESIDUAL DEFICIT: ICD-10-CM

## 2023-08-24 DIAGNOSIS — E66.01 MORBID OBESITY WITH BMI OF 60.0-69.9, ADULT (HCC): Primary | ICD-10-CM

## 2023-08-24 DIAGNOSIS — Z99.89 OSA ON CPAP: ICD-10-CM

## 2023-08-24 DIAGNOSIS — G47.33 OSA ON CPAP: ICD-10-CM

## 2023-08-24 DIAGNOSIS — I10 PRIMARY HYPERTENSION: ICD-10-CM

## 2023-08-24 PROBLEM — N39.46 MIXED STRESS AND URGE URINARY INCONTINENCE: Status: ACTIVE | Noted: 2019-04-30

## 2023-08-24 PROBLEM — E78.5 HYPERLIPIDEMIA: Status: ACTIVE | Noted: 2021-03-29

## 2023-08-24 PROBLEM — R45.89 DEPRESSED MOOD: Status: ACTIVE | Noted: 2019-04-30

## 2023-08-24 PROBLEM — I12.9 BENIGN HYPERTENSIVE RENAL DISEASE: Status: ACTIVE | Noted: 2019-08-05

## 2023-08-24 PROBLEM — R13.10 DYSPHAGIA: Status: ACTIVE | Noted: 2021-12-01

## 2023-08-24 PROBLEM — Z90.49 HX OF CHOLECYSTECTOMY: Status: ACTIVE | Noted: 2020-07-10

## 2023-08-24 RX ORDER — LOSARTAN POTASSIUM 25 MG/1
25 TABLET ORAL DAILY
COMMUNITY
Start: 2023-03-07 | End: 2024-03-06

## 2023-08-24 RX ORDER — AMLODIPINE BESYLATE 10 MG/1
10 TABLET ORAL DAILY
COMMUNITY
Start: 2023-07-03

## 2023-08-24 RX ORDER — CALCIUM ACETATE 667 MG/1
667 CAPSULE ORAL 3 TIMES DAILY
COMMUNITY
Start: 2023-08-18

## 2023-08-24 RX ORDER — CLONIDINE HYDROCHLORIDE 0.2 MG/1
0.2 TABLET ORAL 2 TIMES DAILY
COMMUNITY
Start: 2023-07-04

## 2023-08-24 RX ORDER — CILOSTAZOL 100 MG/1
100 TABLET ORAL DAILY
COMMUNITY
Start: 2023-08-07

## 2023-08-24 RX ORDER — LABETALOL 300 MG/1
TABLET, FILM COATED ORAL
COMMUNITY
Start: 2023-06-03

## 2023-08-24 RX ORDER — LINAGLIPTIN 5 MG/1
1 TABLET, FILM COATED ORAL DAILY
COMMUNITY
Start: 2023-06-08

## 2023-08-24 RX ORDER — ASPIRIN 325 MG
325 TABLET ORAL DAILY
COMMUNITY
Start: 2023-03-07 | End: 2024-03-06

## 2023-08-24 RX ORDER — CALCITRIOL 0.25 UG/1
1 CAPSULE, LIQUID FILLED ORAL DAILY
COMMUNITY
Start: 2023-03-06

## 2023-08-24 RX ORDER — OMEPRAZOLE 40 MG/1
1 CAPSULE, DELAYED RELEASE ORAL
COMMUNITY

## 2023-08-24 RX ORDER — DORZOLAMIDE HYDROCHLORIDE AND TIMOLOL MALEATE 20; 5 MG/ML; MG/ML
1 SOLUTION/ DROPS OPHTHALMIC 2 TIMES DAILY
COMMUNITY

## 2023-08-24 RX ORDER — LEVOTHYROXINE SODIUM 0.12 MG/1
125 TABLET ORAL DAILY
COMMUNITY
Start: 2023-07-29

## 2023-08-24 RX ORDER — LATANOPROST 50 UG/ML
SOLUTION/ DROPS OPHTHALMIC
COMMUNITY

## 2023-08-24 RX ORDER — FERROUS SULFATE 325(65) MG
325 TABLET ORAL
COMMUNITY

## 2023-08-24 RX ORDER — FUROSEMIDE 20 MG/1
1 TABLET ORAL DAILY
COMMUNITY
Start: 2023-05-15

## 2023-08-24 RX ORDER — ROSUVASTATIN CALCIUM 20 MG/1
1 TABLET, COATED ORAL 2 TIMES WEEKLY
COMMUNITY

## 2023-08-24 NOTE — PATIENT INSTRUCTIONS
Goals: Food log (ie.) [email or web address],[email or web address],[email or web address],Snocap. com,etc.   No sugary beverages. At least 64oz of water daily.   0746-1201 calories per day  5-10 servings of fruits and vegetables per day  3oz lean protein, 1/2 cup starch and 1 cup of nonstarchy veggie  4oz  juice per day  Measure creamer in coffee  Follow up with Pulm regarding LIZZETTE  Reduce fruit juice to 4oz

## 2023-08-24 NOTE — PROGRESS NOTES
Assessment/Plan: Morbid obesity with BMI of 60.0-69.9, adult ([address])  -Discussed options of Conservative program. Patient is not a candidate for bariatric surgery  -Discussed options of  and the role of weight loss medications.  -Initial weight loss goal of 5-10% weight loss for improved health  -Screening labs: reviewed CMP, Lipid panel, TSH, HgbA1c  -Patient is interested in pursuing conservative program  -Calorie goals, sample menu, portion size guidelines, and food logging reviewed with the patient. 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 TO be discussed with patient's daughter and PCP. Hypothyroidism  -On levothyroxine  -TSH WNL    Goals:    Food log (ie.) www.Placester.[email or web address],HowStuffWorks,Tu Closet Mi Closet,calorieking. com,etc.   No sugary beverages. At least 64oz of water daily. Increase physical activity by 10 minutes daily. 8694-3033 calories per day  5-10 servings of fruits and vegetables per day   Follow up with Pulm regarding LIZZETTE  Reduce fruit juice to 4oz      Follow up in approximately 2 months with Non-Surgical Physician/Advanced Practitioner.     Diagnoses and all orders for this visit:    Morbid obesity with BMI of 60.0-69.9, adult ([address])    LIZZETTE on CPAP    Chronic diastolic CHF (congestive heart failure) ([address])    Primary hypertension    History of hemorrhagic cerebrovascular accident (CVA) with residual deficit    Hypothyroidism    Other orders  -     labetalol (NORMODYNE) 300 mg tablet; INDICATIONS: HIGH BLOOD PRESSURE, PLEASE CHANGE FREQUENCY TO EVERY 8 HRS. ONE TAB EVERY 8 HRS  -     furosemide (LASIX) 20 mg tablet; Take 1 tablet by mouth daily  -     calcium acetate (PHOSLO) capsule; Take 667 mg by mouth 3 (three) times a day  -     calcitriol (ROCALTROL) 0.25 mcg capsule; Take 1 capsule by mouth daily  -     cilostazol (PLETAL) 100 mg tablet; Take 100 mg by mouth daily  -     ferrous sulfate 325 (65 Fe) mg tablet; Take 325 mg by mouth daily with breakfast  -     rosuvastatin (CRESTOR) 20 MG tablet; Take 1 tablet by mouth 2 (two) times a week  -     linaGLIPtin (Tradjenta) 5 MG TABS; Take 1 tablet by mouth daily  -     aspirin 325 mg tablet; Take 325 mg by mouth daily  -     levothyroxine 125 mcg tablet; Take 125 mcg by mouth daily  -     omeprazole (PriLOSEC) 40 MG capsule; Take 1 capsule by mouth  -     amLODIPine (NORVASC) 10 mg tablet; Take 10 mg by mouth daily  -     cloNIDine (CATAPRES) 0.2 mg tablet; Take 0.2 mg by mouth 2 (two) times a day  -     dorzolamide-timolol (COSOPT) 22.3-6.8 MG/ML ophthalmic solution; Apply 1 drop to eye 2 (two) times a day  -     latanoprost (XALATAN) 0.005 % ophthalmic solution; as directed  -     losartan (COZAAR) 25 mg tablet; Take 25 mg by mouth daily          Subjective:   Chief Complaint   Patient presents with   • Consult       Patient ID: Danny Srinivasan  is a 80 y.o. female with excess weight/obesity here to pursue weight managment.     Past Medical History:   Diagnosis Date   • CHF (congestive heart failure) (HCC)    • Chronic kidney disease    • Depression    • Diabetes mellitus ([address])    • Disease of thyroid gland    • Glaucoma    • Hypertension    • Sleep apnea    • Stroke [facility])        HPI:  Obesity/Excess Weight:  Severity: Very Severe  Onset:  Adulthood   Modifiers: Self created diets  Contributing factors: Insufficient Physical Activity  Associated symptoms: comorbid conditions and decreased mobility     Patient here with Niece Magdalene Higuera and Srinivas Sullivan. Had a stroke in 2020 and has since been wheelchair bound. Lives with daughter    Goals: No set number  Highest: current ( estimated)    Hydration: water 48oz, Cranberry juice 24oz, Whole milk, prune juice  ETOH: denies  Exercise: denies  Sleep: broken sleep  Smoking: denies    Meds administered by home healthcare  -On Tradjenta for DM    B:  Scrambled eggs + fruit + Guamanian toast (Mom's meal)  S: skips  L:  Salsbury steak + rice + green beans or chicken Hermes Or chicken parm ( Mom's meals)  S: skips  D: Mom's meals, Or Caridad and [employer]  S: chips occasionally      The following portions of the patient's history were reviewed and updated as appropriate: allergies, current medications, past family history, past medical history, past social history, past surgical history and problem list.    Review of Systems   Constitutional: Negative for fever. HENT: Negative for sore throat. Respiratory: Positive for cough and shortness of breath ( chronic). Cardiovascular: Negative for chest pain and palpitations. Gastrointestinal: Negative for abdominal pain, nausea and vomiting. Genitourinary: Negative for dysuria. Musculoskeletal: Positive for arthralgias. Skin: Negative for rash. Neurological: Positive for weakness. Negative for dizziness and headaches. Psychiatric/Behavioral: Positive for dysphoric mood ( hx dementia). Objective:    Pulse 87   Temp 98.3 °F (36.8 °C)   Resp 20   Ht 5' 0.5" (1.537 m)   Wt (!) 145 kg (319 lb 10.7 oz) Comment: patient unable to step on scale, this was an estimated weight provided by daughter  SpO2 95%   BMI 61.40 kg/m²     Physical Exam  Vitals and nursing note reviewed.    Constitutional:       General: She is not in acute distress. Appearance: She is obese. She is not ill-appearing, toxic-appearing or diaphoretic. HENT:      Head: Normocephalic. Mouth/Throat:      Mouth: Mucous membranes are moist.   Eyes:      General: No scleral icterus. Pulmonary:      Effort: Pulmonary effort is normal. No respiratory distress. Abdominal:      Comments: Obese, protuberant   Musculoskeletal:      Right lower leg: Edema present. Left lower leg: Edema present. Skin:     General: Skin is dry. Neurological:      Mental Status: She is alert. Mental status is at baseline. Psychiatric:         Mood and Affect: Mood normal.         Thought Content:  Thought content normal.

## 2023-08-24 NOTE — ASSESSMENT & PLAN NOTE
-Discussed options of Conservative program. Patient is not a candidate for bariatric surgery  -Discussed options of  and the role of weight loss medications.  -Initial weight loss goal of 5-10% weight loss for improved health  -Screening labs: reviewed CMP, Lipid panel, TSH, HgbA1c  -Patient is interested in pursuing conservative program  -Calorie goals, sample menu, portion size guidelines, and food logging reviewed with the patient.

## 2023-08-29 ENCOUNTER — OFFICE VISIT (OUTPATIENT)
Age: 83
End: 2023-08-29
Payer: MEDICARE

## 2023-08-29 DIAGNOSIS — I63.9 CEREBROVASCULAR ACCIDENT (CVA), UNSPECIFIED MECHANISM (HCC): Primary | ICD-10-CM

## 2023-08-29 DIAGNOSIS — I69.359 CVA, OLD, HEMIPARESIS (HCC): ICD-10-CM

## 2023-08-29 DIAGNOSIS — R29.898 WEAKNESS OF BOTH LOWER EXTREMITIES: Primary | ICD-10-CM

## 2023-08-29 PROCEDURE — 97112 NEUROMUSCULAR REEDUCATION: CPT | Performed by: PHYSICAL THERAPIST

## 2023-08-29 PROCEDURE — 97112 NEUROMUSCULAR REEDUCATION: CPT

## 2023-08-29 NOTE — PROGRESS NOTES
POC expires Auth Status Total   Visits  Start date  Expiration date PT/OT + Visit Limit? Co-Insurance   23 BOMN     BOMN                                            Visit/Unit Tracking  AUTH Status:  Date  8/3 8/ 8/10 8/15 8/17 8/22 8/29       Visits  Authed:  Used 1(eval)  1 1 1 1 1 1 1 1 (progress note)  1 1 1 1 1 1 1 1 (re-evaluation) 1        Remaining  23 25 21 20 23 18 17 16 15 14 13 12 11 10 9 8 7 6         PLAN OF CARE START:23  PLAN OF CARE END: 2023  PROGRESS NOTE DUE: follow up next visit; discharge pending next week pending family discussion on best therapy placement for patient-23 last scheduled day   FREQUENCY: 1-2x/week for 8-12 weeks   PRECAUTIONS: 1:1 at all times, cog/aphasia, maintainence program     DIAGNOSIS: Chronic CVA with R side weakness   VISITS:         Daily Note     Today's date: 2023  Patient name: Tim Malone  : 1940  MRN: [id number]  Referring provider: Estrella Adames MD  Dx:   Encounter Diagnosis   Name Primary? • Cerebrovascular accident (CVA), unspecified mechanism ([address]) Yes                  Subjective: Pt and caregiver reports no changes since last visit. Objective: See treatment below. NMRE:   *R UE PROM: seated in w/c into all planes and pivots to patient tolerance with 5-10 second hold at end range. Focused on decreased stiffness, increased ROM, and to promote motor recovery for increased functional use of R UE. *reach,grasp,removal of squigs mirror>bucket on L side tabletop, alternating R and L UE. Emphasis on weight shifting, core stability, UE ROM, motor control/coordination. Required physical assist for R UE proximal stability and gross grasp on squig prior to removal. Performed seated in w/c. Up to max A for anterior weight shifting. x2 bouts with R and L UE, rest breaks donned as needed.        *ball roll across mat ~4' to/from therapist with patient seated in w/c with emphasis on core stability, UE motor control, ROM, and weight shifting-lateral and anterior. Max VC for technique. Utilized L UE, occasional attempts to use R UE with minimal success. Tolerated task well with minimal fatigue noted. Assessment: Tolerated treatment well. Informed caregivers 8/31/23 is last scheduled day of OT at this clinic. Benefits form verbal encouragement throughout session. Continues to demonstrate apraxic movement patterns during task performance. Trini Sorensen (2013) is a [facility] decision that clarified that Medicare will in fact cover skilled therapy services to maintain a patient’s current condition or prevent/slow further deterioration. Pt would benefit from continued OT services to address R UE AAROM, weight bearing, sitting balance, weight shifting, tone management, endurance/acitivty tolerance, basic direction following, sustained attention. Plan: Continued skilled OT per POC.

## 2023-08-29 NOTE — PROGRESS NOTES
Daily Note     Today's date: 2023  Patient name: Chani Mccann  : 1940  MRN: [id number]  Referring provider: Adrian Barney MD  Dx:   Encounter Diagnosis     ICD-10-CM    1. Weakness of both lower extremities  R29.898       2. CVA, old, hemiparesis ([address])  I69.359                      Subjective: Pt reports that she is doing well, nothing new to report. Present in w/c with right side not locking. Objective: See treatment diary below    Sit to standing @ long bar: 3-5 minute rest break between each attempt to stand to allow recovery for max effort for next rep pre research standards. VCs: to remain upright, manual blocking of right knee with PT knee to hold knee extension. Rep 1: Max A x 1 duration of standing, 32 seconds, improvements with cuing for hip extension and knee extension. Rep 2: Max A x 1, duration of standing 28 seconds, improvements with cuing for hip extension and knee extension. Rep 3: Max A x 1, duration of standing 22 seconds, improvements with cuing for hip extension and knee extension. Rep 4: Max A x 1, duration of standing 21 seconds, improvements with cuing for hip extension and knee extension. Rep 5: Max A x1, duration of standing 15 seconds, VC unsuccessful to improve righting posture. Rep 6: Max Ax1, duration of standing 7 seconds, VC unsuccessful to improve righting posture. Seated LAQ    R LE: manual resistance with extension  and flexion 1 sets 10 reps, v   L LE: manual resistance with extension  1 sets 10 reps,     Seated march:   R LE: active assistive to achieve full height and cuing for eccentric lowering to challenge hip flexor more, 1 sets x 10 reps    L LE: manual resistance throughout range with varying degree to allow full range      HEP:  Access Code: [email or web address]  URL: [email or web address]  Date: 06/15/2023  Prepared by: Forest Steen    Exercises  - Seated March  - 1 x daily - 7 x weekly - 2 sets - 10 reps - 3 hold  - Seated Long Arc Quad  - 1 x daily - 7 x weekly - 2 sets - 10 reps - 3 hold  - Seated Trunk Rotation - Arms Crossed  - 1 x daily - 7 x weekly - 2 sets - 10 reps - 3 hold  - Seated Upright Posture Correction  - 1 x daily - 7 x weekly - 2 sets - 5 reps - 60 hold  - active weight shifting in wheelchair 1 x 10    Assessment:   Was able to stand with Max A x 1 person > 30 seconds 1 times, >15 seconds for 4 stands. Decrease participation with reps 5 and 6. Continued to require single step commands with pull arms and seqeeze butt to achieve as best upright posture. PT positioning of feet, blocking of right knee to prevent inversion of foot. Education to continue with HEP , caregiver reported unable to conduct it regularly. Patient continues to require encouragement to participate as well as therapeutic rest breaks due to  sig fatigue that occurs. Patient ended session feeling fatigued. Spoke with patient daughter about transition of care to [facility] in [place], noted that she needs scripts before scheduling. Reached out to PCP office requesting updated scripts and to be faxed to desired location [facility] . Continue to try and stand patient next visit. Plan: Continue per plan of care. POC expires Auth Status Total   Visits  Start date  Expiration date PT/OT + Visit Limit?  Co-Insurance   9/5/23 Not req BOMN 6/13/23 9/5/23 no Yes                                         Visit/Unit Tracking  6/13 6/15 6/22 6/27 6/29 7/11 7/18 7/20 -SC 7/25 7/27 8/1 8/3   8/8 8/10 8/15 8/17 8/22 8/29

## 2023-08-31 ENCOUNTER — OFFICE VISIT (OUTPATIENT)
Age: 83
End: 2023-08-31
Payer: MEDICARE

## 2023-08-31 DIAGNOSIS — I69.359 CVA, OLD, HEMIPARESIS (HCC): ICD-10-CM

## 2023-08-31 DIAGNOSIS — I63.9 CEREBROVASCULAR ACCIDENT (CVA), UNSPECIFIED MECHANISM (HCC): Primary | ICD-10-CM

## 2023-08-31 DIAGNOSIS — R29.898 WEAKNESS OF BOTH LOWER EXTREMITIES: Primary | ICD-10-CM

## 2023-08-31 PROBLEM — E03.9 HYPOTHYROIDISM: Status: ACTIVE | Noted: 2021-03-29

## 2023-08-31 PROBLEM — E11.69 DIABETES MELLITUS TYPE 2 IN OBESE (HCC): Status: ACTIVE | Noted: 2023-08-31

## 2023-08-31 PROBLEM — E66.9 DIABETES MELLITUS TYPE 2 IN OBESE (HCC): Status: ACTIVE | Noted: 2023-08-31

## 2023-08-31 PROCEDURE — 97112 NEUROMUSCULAR REEDUCATION: CPT

## 2023-08-31 NOTE — ASSESSMENT & PLAN NOTE
Wt Readings from Last 3 Encounters:   08/24/23 (!) 145 kg (319 lb 10.7 oz)   08/24/16 104 kg (230 lb 2 oz)   06/03/16 104 kg (229 lb)     -On Lasix  -followed by cardiology

## 2023-08-31 NOTE — PROGRESS NOTES
PT Discharge / Daily Note     Today's date: 2023  Patient name: Candice Ahn  : 1940  MRN: [id number]  Referring provider: Trevor Farris MD  Dx:   Encounter Diagnosis     ICD-10-CM    1. Weakness of both lower extremities  R29.898       2. CVA, old, hemiparesis ([address])  I69.359           Start Time: 1415  Stop Time: 1500  Total time in clinic (min): 45 minutes    Subjective: Pt reports that she is doing well, nothing new to report. With niece and caregiver. Present in w/c with right side not locking. Objective: See treatment diary below    Sit to standing @ long bar: 3-5 minute rest break between each attempt to stand to allow recovery for max effort for next rep pre research standards. VCs: to remain upright, manual blocking of right knee with PT knee to hold knee extension. Rep 1: Max A x 1 duration of standing, 19 seconds, improvements with cuing for hip extension and knee extension. Rep 2: Max A x 1, duration of standing 28 seconds, improvements with cuing for hip extension and knee extension. Rep 3: Max A x 1, duration of standing 32 seconds, improvements with cuing for hip extension and knee extension. Rep 4: Max A x 1, duration of standing 21 seconds, improvements with cuing for hip extension and knee extension. Rep 5: Max A x1, duration of standing 5 seconds, VC unsuccessful to improve righting posture.      Forward Reach in Chair:   2x10 hands clasped for forward translation in chair       Seated LAQ    R LE: manual resistance with extension  and flexion 1 sets 10 reps, v   L LE: manual resistance with extension  1 sets 10 reps,     Seated march:   R LE: active assistive to achieve full height and cuing for eccentric lowering to challenge hip flexor more, 1 sets x 10 reps    L LE: manual resistance throughout range with varying degree to allow full range     Seated Hip Ext    Isometric R+L 2x5x5"      HEP:  Access Code: [email or web address]  URL: [email or web address]  Date: 06/15/2023  Prepared by: Paris Price    Exercises  - Seated March  - 1 x daily - 7 x weekly - 2 sets - 10 reps - 3 hold  - Seated Long Arc Quad  - 1 x daily - 7 x weekly - 2 sets - 10 reps - 3 hold  - Seated Trunk Rotation - Arms Crossed  - 1 x daily - 7 x weekly - 2 sets - 10 reps - 3 hold  - Seated Upright Posture Correction  - 1 x daily - 7 x weekly - 2 sets - 5 reps - 60 hold  - active weight shifting in wheelchair 1 x 10    Assessment:   Was able to stand with Max A x 1 person > 20 seconds 1 times, >15 seconds for 4 stands. Decrease participation with reps 5 and 6. Continued to require single step commands with pull arms and seqeeze butt to achieve as best upright posture although there is very little carryover during session. Third stand the best with upright positioniong after performing forward reaches - able to perform one good glute squeeze that really helped upright positioning. PT positioning of feet, blocking of right knee to prevent inversion of foot. Education to continue with HEP , caregiver reported unable to conduct it regularly. Patient continues to require encouragement to participate as well as therapeutic rest breaks due to  sig fatigue that occurs. Patient ended session feeling fatigued. Patient's care team (niece and caregiver) unsure about going to [facility] in [place], [place] reporting that this is too far of a drive - may prefer home PT services. Patient reports that she has all home exercises and will try to perform them. Short Term Goals (4 weeks):    - Patient will be independent in basic HEP 2-3 weeks  - Patient will improve 5xSTS score by 2.3 seconds to promote improved LE functional strength needed for ADLs  - Patient will demo standing tolerance to 60s MET with Mod to Max A x 1 person at Long bar and UE support.    - Pt will improve SIS to 230   - Pt will improve STS to mod Ax1 Progressing     Long Term Goals (12 weeks):  - Patient will be independent in a comprehensive home exercise program  - Patient will complete SCOTT to facilitate return to safe independent ambulation  - Patient will stand at [facility] for 2 minutes  - Patient will improve SIS to 250  - Pt will complete slide board transfer with Mod Ax1 to demo improved independence and dec caregiver burden      Plan: Continue per plan of care. POC expires Auth Status Total   Visits  Start date  Expiration date PT/OT + Visit Limit?  Co-Insurance   9/5/23 Not req BOMN 6/13/23 9/5/23 no Yes                                         Visit/Unit Tracking  6/13 6/15 6/22 6/27 6/29 7/11 7/18 7/20 -[place] 7/25 7/27 8/1 8/3   8/8 8/10 8/15 8/17 8/22 8/29 8/31

## 2023-08-31 NOTE — ASSESSMENT & PLAN NOTE
-patient reports not wearing CPAP  -advised to discuss with pulmonology  -can improve with weight loss

## 2023-08-31 NOTE — ASSESSMENT & PLAN NOTE
Lab Results   Component Value Date    HGBA1C 8.1 (H) 01/11/2023   -On Tradjenta  -BS uncontrolled  -managed by PCP  -Can consider GLP-1 such as Ozempic. TO be discussed with patient's daughter and PCP.

## 2023-08-31 NOTE — PROGRESS NOTES
POC expires Auth Status Total   Visits  Start date  Expiration date PT/OT + Visit Limit? Co-Insurance   23 BOMN     BOMN                                            Visit/Unit Tracking  AUTH Status:  Date  8/3 8/ 8/10 8/15 8/17 8/22 8/29 8/31      Visits  Authed:  Used 1(eval)  1 1 1 1 1 1 1 1 (progress note)  1 1 1 1 1 1 1 1 (re-evaluation) 1 1 (DISCHARGE)       Remaining  23 25 21 20 23 18 17 16 15 14 13 12 11 10 9 8 7 6 7        PLAN OF CARE START:23  PLAN OF CARE END: 2023  PROGRESS NOTE DUE: 23 last scheduled day    FREQUENCY: 1-2x/week for 8-12 weeks   PRECAUTIONS: 1:1 at all times, cog/aphasia, maintainence program     DIAGNOSIS: Chronic CVA with R side weakness   VISITS:  of 24        Daily Note     Today's date: 2023  Patient name: Lala Hearn  : 1940  MRN: [id number]  Referring provider: Sathish Marmolejo MD  Dx:   Encounter Diagnosis   Name Primary? • Cerebrovascular accident (CVA), unspecified mechanism ([address]) Yes                  Subjective: Pt and caregiver reports no changes since last visit. Objective: See treatment below. NMRE:   *R UE PROM: seated in w/c into all planes and pivots to patient tolerance with 5-10 second hold at end range. Focused on decreased stiffness, increased ROM, and to promote motor recovery for increased functional use of R UE. *reach,grasp, release of small 1.5 inch blocks table on R side>target on L side using R UE gross grasp/release pattern. Max A for anterior weight shifting, max VC for motor control, and therapist assist for proximal stability/ROM to transport block. *anterior weight shifting/modified crunch, x15 reps seated in w/c via anterior weight shifting to large physioball. Mod VC and physical assist overall. Assessment: Tolerated treatment well.   Last scheduled day of therapy today, discussed with caregivers including home health vs outpatient facilities. Educated that this facility does not support patient bariatric needs. Educated in home health orders that were obtained by primary PT in previous session and re-educated in orders placed for outpatient facility. Caregivers verbalized understanding. Continues to demonstrate apraxic movement patterns during task performance. Trini Sorensen (2013) is a [facility] decision that clarified that Medicare will in fact cover skilled therapy services to maintain a patient’s current condition or prevent/slow further deterioration. Pt would benefit from continued OT services to address R UE AAROM, weight bearing, sitting balance, weight shifting, tone management, endurance/acitivty tolerance, basic direction following, sustained attention. Plan: Continued skilled OT per POC.

## 2023-12-11 ENCOUNTER — OFFICE VISIT (OUTPATIENT)
Dept: BARIATRICS | Facility: CLINIC | Age: 83
End: 2023-12-11
Payer: MEDICARE

## 2023-12-11 VITALS
HEART RATE: 77 BPM | DIASTOLIC BLOOD PRESSURE: 84 MMHG | SYSTOLIC BLOOD PRESSURE: 136 MMHG | OXYGEN SATURATION: 99 % | RESPIRATION RATE: 18 BRPM

## 2023-12-11 DIAGNOSIS — E66.01 MORBID OBESITY (HCC): ICD-10-CM

## 2023-12-11 DIAGNOSIS — E03.9 HYPOTHYROIDISM: ICD-10-CM

## 2023-12-11 DIAGNOSIS — I50.32 CHRONIC DIASTOLIC CHF (CONGESTIVE HEART FAILURE) (HCC): ICD-10-CM

## 2023-12-11 DIAGNOSIS — E66.9 DIABETES MELLITUS TYPE 2 IN OBESE: Primary | ICD-10-CM

## 2023-12-11 DIAGNOSIS — E11.69 DIABETES MELLITUS TYPE 2 IN OBESE: Primary | ICD-10-CM

## 2023-12-11 DIAGNOSIS — N18.6 END STAGE RENAL DISEASE (HCC): ICD-10-CM

## 2023-12-11 DIAGNOSIS — R13.10 DYSPHAGIA: ICD-10-CM

## 2023-12-11 PROCEDURE — 99214 OFFICE O/P EST MOD 30 MIN: CPT | Performed by: FAMILY MEDICINE

## 2023-12-11 RX ORDER — BUDESONIDE 0.5 MG/2ML
0.5 INHALANT ORAL 2 TIMES DAILY
COMMUNITY
Start: 2023-10-12

## 2023-12-11 RX ORDER — PEN NEEDLE, DIABETIC 32GX 5/32"
NEEDLE, DISPOSABLE MISCELLANEOUS 4 TIMES DAILY
COMMUNITY
Start: 2023-10-31

## 2023-12-11 RX ORDER — DOCUSATE SODIUM 100 MG/1
100 CAPSULE, LIQUID FILLED ORAL 2 TIMES DAILY
COMMUNITY
Start: 2023-11-06

## 2023-12-11 RX ORDER — LEVALBUTEROL INHALATION SOLUTION 1.25 MG/3ML
SOLUTION RESPIRATORY (INHALATION)
COMMUNITY
Start: 2023-10-11

## 2023-12-11 RX ORDER — POLYETHYLENE GLYCOL 3350 17 G/17G
17 POWDER, FOR SOLUTION ORAL DAILY
COMMUNITY
Start: 2023-09-25 | End: 2024-09-24

## 2023-12-11 RX ORDER — BISACODYL 10 MG
10 SUPPOSITORY, RECTAL RECTAL DAILY PRN
COMMUNITY
Start: 2023-12-08 | End: 2023-12-18

## 2023-12-11 RX ORDER — INSULIN ASPART 100 [IU]/ML
INJECTION, SOLUTION INTRAVENOUS; SUBCUTANEOUS
COMMUNITY
Start: 2023-11-28

## 2023-12-11 RX ORDER — BLOOD SUGAR DIAGNOSTIC
STRIP MISCELLANEOUS DAILY
COMMUNITY
Start: 2023-09-11

## 2023-12-11 RX ORDER — LANCETS 33 GAUGE
EACH MISCELLANEOUS DAILY
COMMUNITY
Start: 2023-09-11

## 2023-12-11 RX ORDER — BLOOD-GLUCOSE METER
EACH MISCELLANEOUS DAILY
COMMUNITY
Start: 2023-09-13

## 2023-12-11 RX ORDER — SERTRALINE HYDROCHLORIDE 25 MG/1
TABLET, FILM COATED ORAL
COMMUNITY
Start: 2023-09-23

## 2023-12-11 RX ORDER — CLOTRIMAZOLE AND BETAMETHASONE DIPROPIONATE 10; .64 MG/G; MG/G
1 CREAM TOPICAL 2 TIMES DAILY
COMMUNITY
Start: 2023-11-24

## 2023-12-11 NOTE — PROGRESS NOTES
Assessment/Plan:    1. Diabetes mellitus type 2 in obese         2. Dysphagia        3. Hypothyroidism        4. Chronic diastolic CHF (congestive heart failure) ([address])        5. End stage renal disease ([address])        6. Morbid obesity ([address])          Discussed with daughter my advice:  Based on End stage renal disease and uncontrolled diabetes:   Lab Results   Component Value Date    HGBA1C 10.3 (H) 10/07/2023     GFR 16 , max 46 g protein per day - handouts given with protein shake, vegetarian protein sources   Advised grain and vegetable based diet, focus on renal restrictions , it seems dysphagia is improved  Make sure to check with endocrinology for diabetes control, discuss Ozempic if appropriate for her case with endocrine  Discussed her diabetes control, small meals , avoid high saturated fat meals like turkey del rosario  Meet small goals related to her weight  Discussed the need to have the multidisciplinary approach of her mother    After 30 min discussion advised her daughter- niece left the room in the middle of our conversation, without an explanation- that main goal for her mother is to control diabetes, this way her weight will respond to a diet plan  No additional medications are indicated for her regarding appetite suppressants due to her current stroke, CAD , mental status   30 minute visit, >50% face-to-face time spent counseling patient's daughter  on diet behavior  modification for weight loss. All questions were answered and daughter agreed with the plan. No follow up here is indicated at this point      Subjective:   Chief Complaint   Patient presents with    Follow-up       Patient ID: Priyanka Swann  is a 80 y.o. female with excess weight/obesity here to pursue weight managment.     Past Medical History:   Diagnosis Date    CHF (congestive heart failure) (HCC)     Chronic kidney disease     Depression     Diabetes mellitus ([address])     Disease of thyroid gland     Glaucoma     Hypertension     Sleep apnea Stroke [facility])        HPI:  Patient is a new patient to me, was seen in our department at another location  Patient here with Niece Hamzah Mason and daughter. Had a stroke in 2020 and has since been wheelchair bound. Lives with daughter   Patient cannot give a history due to dysarthria  History is taken from daughter. Her niece is interfering often with my questions  Very difficult conversation overall   Difficult to obtain a diet history due to her niece often questioning me why I want to know how she lives, what she eats and obtains her meals  So far we know this :   Diet : renal diet , was on thick liquid diet now regular per daughter  Hydration: water 48oz, Cranberry juice 24oz, Whole milk, prune juice  ETOH: denies  Exercise: denies  Sleep: broken sleep  Meds administered by home healthcare    B:  Scrambled eggs + fruit + Turkish toast (Mom's meal) ,turkey del rosario  L:  Salsbury steak + rice + green beans or chicken Hermes Or chicken parm ( Mom's meals)  D: Mom's meals, Or Caridad and KF  S: chips occasionally      The following portions of the patient's history were reviewed and updated as appropriate: allergies, current medications, past family history, past medical history, past social history, past surgical history and problem list.    Review of Systems   Constitutional:  Negative for fever. HENT:  Positive for trouble swallowing. Respiratory:  Positive for cough and shortness of breath ( chronic). Cardiovascular:  Negative for chest pain and palpitations. Gastrointestinal:  Negative for nausea and vomiting. Musculoskeletal:  Positive for arthralgias. Neurological:  Positive for speech difficulty and weakness. Psychiatric/Behavioral:  Positive for confusion. Dysphoric mood:  hx dementia.        Objective:    /84 (BP Location: Left arm, Patient Position: Sitting, Cuff Size: Adult)   Pulse 77   Resp 18   SpO2 99%   We could not obtain a new weight  but daughter states at home she is 330lbs Constitutional: morbidly obese  HEENT: + conjunctival pallor   Pulmonary: No increased work of breathing or signs of respiratory distress.   CV: Well-perfused, Normal rate    Vascular: + peripheral edema  GI: Abdomen obese, Non-distended  Neuro: Oriented to person, cannot reply to my questions, per daughter has aphasia but able to comprehend , smiles to my statements  Psych: cannot assess psych status due to poor interaction

## 2023-12-12 PROBLEM — N18.6 END STAGE RENAL DISEASE (HCC): Status: ACTIVE | Noted: 2023-12-12
